# Patient Record
Sex: MALE | Race: WHITE | HISPANIC OR LATINO | Employment: FULL TIME | URBAN - METROPOLITAN AREA
[De-identification: names, ages, dates, MRNs, and addresses within clinical notes are randomized per-mention and may not be internally consistent; named-entity substitution may affect disease eponyms.]

---
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
## 2019-07-26 ENCOUNTER — OFFICE VISIT (OUTPATIENT)
Dept: URGENT CARE | Facility: PHYSICIAN GROUP | Age: 19
End: 2019-07-26
Payer: COMMERCIAL

## 2019-07-26 VITALS
OXYGEN SATURATION: 98 % | TEMPERATURE: 98 F | BODY MASS INDEX: 18.2 KG/M2 | SYSTOLIC BLOOD PRESSURE: 110 MMHG | HEIGHT: 71 IN | HEART RATE: 67 BPM | WEIGHT: 130 LBS | DIASTOLIC BLOOD PRESSURE: 58 MMHG

## 2019-07-26 DIAGNOSIS — Z20.2 EXPOSURE TO CHLAMYDIA: ICD-10-CM

## 2019-07-26 PROCEDURE — 99214 OFFICE O/P EST MOD 30 MIN: CPT | Performed by: FAMILY MEDICINE

## 2019-07-26 RX ORDER — AZITHROMYCIN 500 MG/1
1000 TABLET, FILM COATED ORAL ONCE
Qty: 2 TAB | Refills: 0 | Status: SHIPPED | OUTPATIENT
Start: 2019-07-26 | End: 2019-07-26

## 2019-07-26 NOTE — LETTER
July 26, 2019         Patient: Daniel Hastings   YOB: 2000   Date of Visit: 7/26/2019           To Whom it May Concern:    Daniel Hastings was seen in my clinic on 7/26/2019. He may return to work on 7/27..    If you have any questions or concerns, please don't hesitate to call.        Sincerely,           Viet Benito M.D.  Electronically Signed

## 2019-07-27 NOTE — PROGRESS NOTES
"   Chief Complaint   Patient presents with   • Medication Refill     girlfriend came up pos for chlamydia / requesting meds        HPI:      Partner recently told him that she was dx with chlamydia.   He has no sx.   Last unprotected intercourse was several days ago.             Past medical history was unremarkable and not pertinent to current issue  Social hx - denies tobacco, alcohol, drug use  Family hx was reviewed - no pertinent past family hx             Review of Systems   Constitutional: Negative for fever, chills and malaise/fatigue.   Eyes: Negative for vision changes, d/c.    Respiratory: Negative for cough and sputum production.    Cardiovascular: Negative for chest pain and palpitations.   Gastrointestinal: Negative for nausea, vomiting, abdominal pain, diarrhea and constipation.   Genitourinary: Negative for dysuria, urgency and frequency.   Skin: Negative for rash or  itching.   Neurological: Negative for dizziness and tingling.   Psychiatric/Behavioral: Negative for depression.   Hematologic/lymphatic - denies bruising or excessive bleeding  All other systems reviewed and are negative.      Objective  /58   Pulse 67   Temp 36.7 °C (98 °F) (Temporal)   Ht 1.803 m (5' 11\")   Wt 59 kg (130 lb)   SpO2 98%       HEENT - PERRLA, EOMI  Neuro - alert and oriented x3. CN 2-12 grossly intact.  Lungs - CTA. No wheezes, rhonchi or rales.  Heart - regular rate and rhythm without murmur.  Abdomen - soft and non-tender, bowel sounds active x4.  Musculoskeletal - No lower extremity edema noted.        A/P:       1. Exposure to chlamydia    will treat for chalymdia.     - azithromycin (ZITHROMAX) 500 MG tablet; Take 2 Tabs by mouth Once for 1 dose.  Dispense: 2 Tab; Refill: 0        "

## 2019-09-16 ENCOUNTER — OFFICE VISIT (OUTPATIENT)
Dept: URGENT CARE | Facility: PHYSICIAN GROUP | Age: 19
End: 2019-09-16
Payer: COMMERCIAL

## 2019-09-16 VITALS
DIASTOLIC BLOOD PRESSURE: 80 MMHG | HEIGHT: 71 IN | HEART RATE: 75 BPM | SYSTOLIC BLOOD PRESSURE: 112 MMHG | BODY MASS INDEX: 18.62 KG/M2 | RESPIRATION RATE: 18 BRPM | WEIGHT: 133 LBS | OXYGEN SATURATION: 99 % | TEMPERATURE: 97.4 F

## 2019-09-16 DIAGNOSIS — R31.21 ASYMPTOMATIC MICROSCOPIC HEMATURIA: ICD-10-CM

## 2019-09-16 LAB
APPEARANCE UR: CLEAR
BILIRUB UR STRIP-MCNC: NEGATIVE MG/DL
COLOR UR AUTO: YELLOW
GLUCOSE UR STRIP.AUTO-MCNC: NEGATIVE MG/DL
KETONES UR STRIP.AUTO-MCNC: NEGATIVE MG/DL
LEUKOCYTE ESTERASE UR QL STRIP.AUTO: NEGATIVE
NITRITE UR QL STRIP.AUTO: NEGATIVE
PH UR STRIP.AUTO: 5.5 [PH] (ref 5–8)
PROT UR QL STRIP: NEGATIVE MG/DL
RBC UR QL AUTO: NORMAL
SP GR UR STRIP.AUTO: 1.02
UROBILINOGEN UR STRIP-MCNC: 0.2 MG/DL

## 2019-09-16 PROCEDURE — 81002 URINALYSIS NONAUTO W/O SCOPE: CPT | Performed by: PHYSICIAN ASSISTANT

## 2019-09-16 PROCEDURE — 99214 OFFICE O/P EST MOD 30 MIN: CPT | Performed by: PHYSICIAN ASSISTANT

## 2019-09-16 ASSESSMENT — ENCOUNTER SYMPTOMS
VOMITING: 0
BRUISES/BLEEDS EASILY: 0
FEVER: 0
MYALGIAS: 0
FLANK PAIN: 0
HEADACHES: 0
CHILLS: 0
WEIGHT LOSS: 0
DIZZINESS: 0
ABDOMINAL PAIN: 0
BACK PAIN: 0
NAUSEA: 0
DIARRHEA: 0

## 2019-09-16 NOTE — PROGRESS NOTES
"Subjective:      Daniel Hastings is a 18 y.o. male who presents with Blood in Urine (x4days )            HPI  17 y/o male presents to  with new problem of intermittent painless hematuria at end of urine stream onset 4 days ago.   Denies passing clots.    No abdominal pain or flank pain. No penile discharge.    Denies f/c, n/v, or diarrhea.   Denies hx of same or cigarette smoking.   Denies other associated aggravating or alleviating factors.     Review of Systems   Constitutional: Negative for chills, fever, malaise/fatigue and weight loss.   Gastrointestinal: Negative for abdominal pain, diarrhea, nausea and vomiting.   Genitourinary: Positive for hematuria. Negative for dysuria, flank pain, frequency and urgency.   Musculoskeletal: Negative for back pain and myalgias.   Skin: Negative for itching and rash.   Neurological: Negative for dizziness and headaches.   Endo/Heme/Allergies: Does not bruise/bleed easily.       History reviewed. No pertinent past medical history.  Current Outpatient Medications on File Prior to Visit   Medication Sig Dispense Refill   • ibuprofen (MOTRIN) 200 MG Tab Take 200 mg by mouth every 6 hours as needed.     • acetaminophen (TYLENOL) 325 MG Tab Take 650 mg by mouth every four hours as needed.     • ondansetron (ZOFRAN) 4 MG Tab tablet Take 1 Tab by mouth every 6 hours as needed for Nausea/Vomiting. (Patient not taking: Reported on 7/31/2018) 15 Tab 0   • ondansetron (ZOFRAN) 4 MG Tab tablet Take 1 Tab by mouth every four hours as needed for Nausea/Vomiting. (Patient not taking: Reported on 9/16/2019) 30 Tab 1     No current facility-administered medications on file prior to visit.      No Known Allergies  Social History     Tobacco Use   • Smoking status: Never Smoker   • Smokeless tobacco: Never Used   Substance Use Topics   • Alcohol use: No      Objective:     /80   Pulse 75   Temp 36.3 °C (97.4 °F) (Temporal)   Resp 18   Ht 1.803 m (5' 11\")   Wt 60.3 kg (133 lb)   " SpO2 99%   BMI 18.55 kg/m²      Physical Exam   Constitutional: He is oriented to person, place, and time. He appears well-developed and well-nourished.   HENT:   Head: Normocephalic and atraumatic.   Mouth/Throat: Oropharynx is clear and moist.   Eyes: Conjunctivae and EOM are normal.   Neck: Normal range of motion. Neck supple.   Cardiovascular: Normal rate, regular rhythm and normal heart sounds.   Pulmonary/Chest: Effort normal and breath sounds normal. No respiratory distress.   Abdominal: Soft. Bowel sounds are normal. He exhibits no distension and no mass. There is no tenderness. There is no guarding and no CVA tenderness.   Genitourinary:   Genitourinary Comments: Patient defers  exam   Neurological: He is alert and oriented to person, place, and time.   Skin: Skin is warm and dry. No erythema.   Psychiatric: He has a normal mood and affect. His behavior is normal. Judgment and thought content normal.   Vitals reviewed.              Assessment/Plan:     1. Asymptomatic microscopic hematuria  POCT Urinalysis    REFERRAL TO UROLOGY    Basic Metabolic Panel     Results for orders placed or performed in visit on 09/16/19   POCT Urinalysis   Result Value Ref Range    POC Color yellow Negative    POC Appearance clear Negative    POC Leukocyte Esterase negative Negative    POC Nitrites negative Negative    POC Urobiligen 0.2 Negative (0.2) mg/dL    POC Protein negative Negative mg/dL    POC Urine PH 5.5 5.0 - 8.0    POC Blood trace-intact Negative    POC Specific Gravity 1.025 <1.005 - >1.030    POC Ketones negative Negative mg/dL    POC Bilirubin negative Negative mg/dL    POC Glucose negative Negative mg/dL     Recommend patient follow up with urology, referral given.  Will follow-up pending lab results. BMP ordered to evaluate kidney function.  PT should follow up with PCP in 1-2 days for re-evaluation if symptoms have not improved.  Discussed red flags and reasons to return to UC or ED.  Pt and/or family  verbalized understanding of diagnosis and follow up instructions and was offered informational handout on diagnosis.  PT discharged.

## 2019-09-17 ENCOUNTER — TELEPHONE (OUTPATIENT)
Dept: URGENT CARE | Facility: PHYSICIAN GROUP | Age: 19
End: 2019-09-17

## 2019-09-17 NOTE — TELEPHONE ENCOUNTER
New order placed for BNP.  Will follow-up pending results. Patient verbalized understand of treatment plan and has no further questions regarding care.

## 2019-09-18 ENCOUNTER — HOSPITAL ENCOUNTER (OUTPATIENT)
Dept: LAB | Facility: MEDICAL CENTER | Age: 19
End: 2019-09-18
Attending: PHYSICIAN ASSISTANT
Payer: COMMERCIAL

## 2019-09-18 DIAGNOSIS — R31.21 ASYMPTOMATIC MICROSCOPIC HEMATURIA: ICD-10-CM

## 2019-09-18 LAB
ANION GAP SERPL CALC-SCNC: 8 MMOL/L (ref 0–11.9)
BUN SERPL-MCNC: 10 MG/DL (ref 8–22)
CALCIUM SERPL-MCNC: 9.7 MG/DL (ref 8.5–10.5)
CHLORIDE SERPL-SCNC: 105 MMOL/L (ref 96–112)
CO2 SERPL-SCNC: 26 MMOL/L (ref 20–33)
CREAT SERPL-MCNC: 0.87 MG/DL (ref 0.5–1.4)
GLUCOSE SERPL-MCNC: 95 MG/DL (ref 65–99)
POTASSIUM SERPL-SCNC: 4 MMOL/L (ref 3.6–5.5)
SODIUM SERPL-SCNC: 139 MMOL/L (ref 135–145)

## 2019-09-18 PROCEDURE — 36415 COLL VENOUS BLD VENIPUNCTURE: CPT

## 2019-09-18 PROCEDURE — 80048 BASIC METABOLIC PNL TOTAL CA: CPT

## 2019-09-23 ENCOUNTER — HOSPITAL ENCOUNTER (OUTPATIENT)
Dept: LAB | Facility: MEDICAL CENTER | Age: 19
End: 2019-09-23
Attending: PHYSICIAN ASSISTANT
Payer: COMMERCIAL

## 2019-09-23 PROCEDURE — 87491 CHLMYD TRACH DNA AMP PROBE: CPT

## 2019-09-23 PROCEDURE — 87591 N.GONORRHOEAE DNA AMP PROB: CPT

## 2019-09-25 LAB
C TRACH DNA SPEC QL NAA+PROBE: NEGATIVE
N GONORRHOEA DNA SPEC QL NAA+PROBE: NEGATIVE
SPECIMEN SOURCE: NORMAL

## 2019-10-15 ENCOUNTER — APPOINTMENT (OUTPATIENT)
Dept: RADIOLOGY | Facility: MEDICAL CENTER | Age: 19
End: 2019-10-15
Attending: PHYSICIAN ASSISTANT
Payer: COMMERCIAL

## 2020-09-01 ENCOUNTER — OFFICE VISIT (OUTPATIENT)
Dept: URGENT CARE | Facility: PHYSICIAN GROUP | Age: 20
End: 2020-09-01
Payer: COMMERCIAL

## 2020-09-01 VITALS
SYSTOLIC BLOOD PRESSURE: 124 MMHG | HEIGHT: 71 IN | RESPIRATION RATE: 12 BRPM | BODY MASS INDEX: 19.6 KG/M2 | HEART RATE: 70 BPM | WEIGHT: 140 LBS | DIASTOLIC BLOOD PRESSURE: 90 MMHG | TEMPERATURE: 97.8 F | OXYGEN SATURATION: 97 %

## 2020-09-01 DIAGNOSIS — G43.009 MIGRAINE WITHOUT AURA AND WITHOUT STATUS MIGRAINOSUS, NOT INTRACTABLE: ICD-10-CM

## 2020-09-01 PROCEDURE — 99213 OFFICE O/P EST LOW 20 MIN: CPT | Performed by: FAMILY MEDICINE

## 2020-09-01 ASSESSMENT — ENCOUNTER SYMPTOMS
EYE REDNESS: 0
WEIGHT LOSS: 0
EYE DISCHARGE: 0
MYALGIAS: 0
VOMITING: 0
PHOTOPHOBIA: 0
NAUSEA: 0

## 2020-09-01 NOTE — LETTER
September 1, 2020         Patient: Daniel Hastings   YOB: 2000   Date of Visit: 9/1/2020           To Whom it May Concern:    Daniel Hastings was seen in my clinic on 9/1/2020. He was seen for a recurrent medical problem that is not a contagious illness.  Please excuse 8/29/2020. He may return to full duty at his next scheduled shift. Again, he is not contagious and has no evidence of COVID19 infection.       Sincerely,           Cirilo Mina M.D.  Electronically Signed

## 2020-09-01 NOTE — PROGRESS NOTES
"Subjective:      Daniel Hastings is a 19 y.o. male who presents with Migraine (migraine on saturday, no migraine today )            Patient with PMH migraine headaches had a headache on 8/29/2020.  This has resolved completely with going home and sleeping.  His workplace is requesting a note that this is not infectious.  He has had no fever, no nasal congestion, no sore throat, no myalgia, no arthralgia, no nausea vomiting or diarrhea.  No other aggravating or alleviating factors.      Review of Systems   Constitutional: Negative for malaise/fatigue and weight loss.   Eyes: Negative for photophobia, discharge and redness.   Gastrointestinal: Negative for nausea and vomiting.   Musculoskeletal: Negative for joint pain and myalgias.   Skin: Negative for itching and rash.     .  Medications, Allergies, and current problem list reviewed today in Epic       Objective:     /90   Pulse 70   Temp 36.6 °C (97.8 °F)   Resp 12   Ht 1.803 m (5' 11\")   Wt 63.5 kg (140 lb)   SpO2 97%   BMI 19.53 kg/m²      Physical Exam  Constitutional:       General: He is not in acute distress.     Appearance: He is well-developed.   HENT:      Head: Normocephalic and atraumatic.   Eyes:      Conjunctiva/sclera: Conjunctivae normal.   Cardiovascular:      Rate and Rhythm: Normal rate and regular rhythm.      Heart sounds: Normal heart sounds. No murmur.   Pulmonary:      Effort: Pulmonary effort is normal.      Breath sounds: Normal breath sounds. No wheezing.   Skin:     General: Skin is warm and dry.      Findings: No rash.   Neurological:      Mental Status: He is alert and oriented to person, place, and time.                 Assessment/Plan:         1. Migraine without aura and without status migrainosus, not intractable       Differential diagnosis, natural history, supportive care, and indications for immediate follow-up discussed at length.     Resolved  Cleared for work, no evidence of infectious/contagious process.   "

## 2020-09-01 NOTE — LETTER
September 1, 2020         Patient: Daniel Hastings   YOB: 2000   Date of Visit: 9/1/2020           To Whom it May Concern:    Daniel Hastings was seen in my clinic on 9/1/2020.  Please excuse 8/29/2020.  He may return to full duty at his next scheduled shift and is not considered contagious.      Sincerely,           Cirilo Mina M.D.  Electronically Signed

## 2022-01-06 ENCOUNTER — OFFICE VISIT (OUTPATIENT)
Dept: URGENT CARE | Facility: CLINIC | Age: 22
End: 2022-01-06
Payer: COMMERCIAL

## 2022-01-06 VITALS
HEIGHT: 71 IN | SYSTOLIC BLOOD PRESSURE: 110 MMHG | WEIGHT: 144.62 LBS | HEART RATE: 70 BPM | OXYGEN SATURATION: 98 % | TEMPERATURE: 98 F | RESPIRATION RATE: 16 BRPM | DIASTOLIC BLOOD PRESSURE: 70 MMHG | BODY MASS INDEX: 20.25 KG/M2

## 2022-01-06 DIAGNOSIS — L74.0 HEAT RASH: ICD-10-CM

## 2022-01-06 PROCEDURE — 99213 OFFICE O/P EST LOW 20 MIN: CPT | Performed by: NURSE PRACTITIONER

## 2022-01-06 ASSESSMENT — ENCOUNTER SYMPTOMS
MYALGIAS: 0
DIZZINESS: 0
WHEEZING: 0
PALPITATIONS: 0
CONSTIPATION: 0
HEADACHES: 0
ABDOMINAL PAIN: 0
ORTHOPNEA: 0
TINGLING: 0
DIARRHEA: 0
SHORTNESS OF BREATH: 0
NAUSEA: 0
WEAKNESS: 0
FEVER: 0
CHILLS: 0
VOMITING: 0
SENSORY CHANGE: 0

## 2022-01-06 NOTE — LETTER
January 6, 2022       Patient: Daniel Hastings   YOB: 2000   Date of Visit: 1/6/2022         To Whom It May Concern:    In my medical opinion, I recommend that Daniel Hastings be excused from work today due to non-contagious illness.    If you have any questions or concerns, please don't hesitate to call [phone]          Sincerely,          CAITLIN Dempsey.  Electronically Signed

## 2022-01-06 NOTE — PROGRESS NOTES
"Subjective     Daniel Hastings is a 21 y.o. male who presents with Itching (from the waist down)            HPI  States had episode of itchiness and heat to groin area when at work today.  works in a sweaty environment. No over the counter meds used. States had been scratching groin but states no rash or redness now. Requesting work note.     PMH:  has no past medical history on file.  MEDS: No current outpatient medications on file.  ALLERGIES: No Known Allergies  SURGHX: History reviewed. No pertinent surgical history.  SOCHX:  reports that he has never smoked. He has never used smokeless tobacco. He reports previous drug use. Drug: Marijuana. He reports that he does not drink alcohol.  FH: Family history was reviewed, no pertinent findings to report    Review of Systems   Constitutional: Negative for chills, fever and malaise/fatigue.   Respiratory: Negative for shortness of breath and wheezing.    Cardiovascular: Negative for chest pain, palpitations and orthopnea.   Gastrointestinal: Negative for abdominal pain, constipation, diarrhea, nausea and vomiting.   Musculoskeletal: Negative for myalgias.   Skin: Positive for itching. Negative for rash.   Neurological: Negative for dizziness, tingling, sensory change, weakness and headaches.   Endo/Heme/Allergies: Negative for environmental allergies.   All other systems reviewed and are negative.             Objective     /70   Pulse 70   Temp 36.7 °C (98 °F) (Temporal)   Resp 16   Ht 1.803 m (5' 11\")   Wt 65.6 kg (144 lb 10 oz)   SpO2 98%   BMI 20.17 kg/m²      Physical Exam  Vitals reviewed.   Constitutional:       General: He is awake. He is not in acute distress.     Appearance: Normal appearance. He is well-developed. He is not ill-appearing, toxic-appearing or diaphoretic.   HENT:      Head: Normocephalic.   Eyes:      Conjunctiva/sclera: Conjunctivae normal.      Pupils: Pupils are equal, round, and reactive to light.   Cardiovascular:      " Rate and Rhythm: Normal rate.   Pulmonary:      Effort: Pulmonary effort is normal.   Genitourinary:     Comments: Patient in gown with underwear on. No rash, erythema or indication for skin abnormality at groin region.   Musculoskeletal:         General: Normal range of motion.      Cervical back: Normal range of motion and neck supple.   Skin:     General: Skin is warm and dry.      Findings: No abrasion, abscess, acne, bruising, burn, ecchymosis, erythema, signs of injury, laceration, lesion, petechiae, rash or wound.   Neurological:      Mental Status: He is alert and oriented to person, place, and time.   Psychiatric:         Attention and Perception: Attention normal.         Mood and Affect: Mood normal.         Speech: Speech normal.         Behavior: Behavior normal. Behavior is cooperative.                             Assessment & Plan             1. Heat rash    -Most probable heat rash that has resolved  -May use over the counter Zyrtec which he has at home as needed for itchiness  -May use over the counter hydrocortisone cream 1% TWICE DAILY as needed for any rash  Work note provided

## 2025-02-14 ENCOUNTER — APPOINTMENT (OUTPATIENT)
Dept: RADIOLOGY | Facility: MEDICAL CENTER | Age: 25
DRG: 062 | End: 2025-02-14
Attending: EMERGENCY MEDICINE
Payer: COMMERCIAL

## 2025-02-14 ENCOUNTER — HOSPITAL ENCOUNTER (INPATIENT)
Facility: MEDICAL CENTER | Age: 25
LOS: 4 days | DRG: 062 | End: 2025-02-18
Attending: EMERGENCY MEDICINE | Admitting: INTERNAL MEDICINE
Payer: COMMERCIAL

## 2025-02-14 ENCOUNTER — APPOINTMENT (OUTPATIENT)
Dept: CARDIOLOGY | Facility: MEDICAL CENTER | Age: 25
DRG: 062 | End: 2025-02-14
Attending: INTERNAL MEDICINE
Payer: COMMERCIAL

## 2025-02-14 ENCOUNTER — APPOINTMENT (OUTPATIENT)
Dept: RADIOLOGY | Facility: MEDICAL CENTER | Age: 25
DRG: 062 | End: 2025-02-14
Attending: PSYCHIATRY & NEUROLOGY
Payer: COMMERCIAL

## 2025-02-14 DIAGNOSIS — R53.1 LEFT-SIDED WEAKNESS: ICD-10-CM

## 2025-02-14 DIAGNOSIS — I63.9 ACUTE CVA (CEREBROVASCULAR ACCIDENT) (HCC): ICD-10-CM

## 2025-02-14 DIAGNOSIS — Q21.12 PFO (PATENT FORAMEN OVALE): ICD-10-CM

## 2025-02-14 DIAGNOSIS — I63.9 CEREBROVASCULAR ACCIDENT (CVA), UNSPECIFIED MECHANISM (HCC): ICD-10-CM

## 2025-02-14 DIAGNOSIS — I63.9 ACUTE ISCHEMIC STROKE (HCC): ICD-10-CM

## 2025-02-14 PROBLEM — E87.6 HYPOKALEMIA: Status: ACTIVE | Noted: 2025-02-14

## 2025-02-14 PROBLEM — D72.820 LYMPHOCYTOSIS: Status: ACTIVE | Noted: 2025-02-14

## 2025-02-14 PROBLEM — G43.909 MIGRAINES: Status: ACTIVE | Noted: 2025-02-14

## 2025-02-14 LAB
ABO + RH BLD: NORMAL
ABO GROUP BLD: NORMAL
ALBUMIN SERPL BCP-MCNC: 4.3 G/DL (ref 3.2–4.9)
ALBUMIN/GLOB SERPL: 1.4 G/DL
ALP SERPL-CCNC: 72 U/L (ref 30–99)
ALT SERPL-CCNC: 16 U/L (ref 2–50)
AMPHET UR QL SCN: NEGATIVE
ANION GAP SERPL CALC-SCNC: 12 MMOL/L (ref 7–16)
APTT PPP: 24 SEC (ref 24.7–36)
AST SERPL-CCNC: 26 U/L (ref 12–45)
BARBITURATES UR QL SCN: NEGATIVE
BASOPHILS # BLD AUTO: 0.9 % (ref 0–1.8)
BASOPHILS # BLD: 0.08 K/UL (ref 0–0.12)
BENZODIAZ UR QL SCN: NEGATIVE
BILIRUB SERPL-MCNC: 0.5 MG/DL (ref 0.1–1.5)
BLD GP AB SCN SERPL QL: NORMAL
BUN SERPL-MCNC: 11 MG/DL (ref 8–22)
BZE UR QL SCN: NEGATIVE
CALCIUM ALBUM COR SERPL-MCNC: 9.3 MG/DL (ref 8.5–10.5)
CALCIUM SERPL-MCNC: 9.5 MG/DL (ref 8.5–10.5)
CANNABINOIDS UR QL SCN: POSITIVE
CHLORIDE SERPL-SCNC: 103 MMOL/L (ref 96–112)
CHOLEST SERPL-MCNC: 125 MG/DL (ref 100–199)
CO2 SERPL-SCNC: 25 MMOL/L (ref 20–33)
COMMENT NL1176: NORMAL
CREAT SERPL-MCNC: 1.04 MG/DL (ref 0.5–1.4)
EKG IMPRESSION: NORMAL
EOSINOPHIL # BLD AUTO: 0.24 K/UL (ref 0–0.51)
EOSINOPHIL NFR BLD: 2.6 % (ref 0–6.9)
ERYTHROCYTE [DISTWIDTH] IN BLOOD BY AUTOMATED COUNT: 40.7 FL (ref 35.9–50)
EST. AVERAGE GLUCOSE BLD GHB EST-MCNC: 128 MG/DL
FENTANYL UR QL: NEGATIVE
GFR SERPLBLD CREATININE-BSD FMLA CKD-EPI: 102 ML/MIN/1.73 M 2
GLOBULIN SER CALC-MCNC: 3 G/DL (ref 1.9–3.5)
GLUCOSE SERPL-MCNC: 140 MG/DL (ref 65–99)
HBA1C MFR BLD: 6.1 % (ref 4–5.6)
HCT VFR BLD AUTO: 47.7 % (ref 42–52)
HDLC SERPL-MCNC: 46 MG/DL
HGB BLD-MCNC: 16.3 G/DL (ref 14–18)
INR PPP: 1.11 (ref 0.87–1.13)
LDLC SERPL CALC-MCNC: 63 MG/DL
LV EJECT FRACT MOD 2C 99903: 58.08
LV EJECT FRACT MOD 4C 99902: 55.58
LV EJECT FRACT MOD BP 99901: 57.38
LYMPHOCYTES # BLD AUTO: 5.22 K/UL (ref 1–4.8)
LYMPHOCYTES NFR BLD: 57.4 % (ref 22–41)
MANUAL DIFF BLD: NORMAL
MCH RBC QN AUTO: 31.2 PG (ref 27–33)
MCHC RBC AUTO-ENTMCNC: 34.2 G/DL (ref 32.3–36.5)
MCV RBC AUTO: 91.4 FL (ref 81.4–97.8)
METHADONE UR QL SCN: NEGATIVE
MONOCYTES # BLD AUTO: 0.71 K/UL (ref 0–0.85)
MONOCYTES NFR BLD AUTO: 7.8 % (ref 0–13.4)
MORPHOLOGY BLD-IMP: NORMAL
NEUTROPHILS # BLD AUTO: 2.85 K/UL (ref 1.82–7.42)
NEUTROPHILS NFR BLD: 31.3 % (ref 44–72)
NRBC # BLD AUTO: 0 K/UL
NRBC BLD-RTO: 0 /100 WBC (ref 0–0.2)
OPIATES UR QL SCN: NEGATIVE
OXYCODONE UR QL SCN: NEGATIVE
PCP UR QL SCN: NEGATIVE
PLATELET # BLD AUTO: 246 K/UL (ref 164–446)
PLATELET BLD QL SMEAR: NORMAL
PMV BLD AUTO: 9.8 FL (ref 9–12.9)
POTASSIUM SERPL-SCNC: 3.6 MMOL/L (ref 3.6–5.5)
PROPOXYPH UR QL SCN: NEGATIVE
PROT SERPL-MCNC: 7.3 G/DL (ref 6–8.2)
PROTHROMBIN TIME: 14.4 SEC (ref 12–14.6)
RBC # BLD AUTO: 5.22 M/UL (ref 4.7–6.1)
RBC BLD AUTO: PRESENT
RH BLD: NORMAL
SMUDGE CELLS BLD QL SMEAR: NORMAL
SODIUM SERPL-SCNC: 140 MMOL/L (ref 135–145)
TRIGL SERPL-MCNC: 81 MG/DL (ref 0–149)
TROPONIN T SERPL-MCNC: <6 NG/L (ref 6–19)
WBC # BLD AUTO: 9.1 K/UL (ref 4.8–10.8)

## 2025-02-14 PROCEDURE — 36415 COLL VENOUS BLD VENIPUNCTURE: CPT

## 2025-02-14 PROCEDURE — 70498 CT ANGIOGRAPHY NECK: CPT

## 2025-02-14 PROCEDURE — 83036 HEMOGLOBIN GLYCOSYLATED A1C: CPT

## 2025-02-14 PROCEDURE — 93005 ELECTROCARDIOGRAM TRACING: CPT | Mod: TC | Performed by: NURSE PRACTITIONER

## 2025-02-14 PROCEDURE — 85300 ANTITHROMBIN III ACTIVITY: CPT

## 2025-02-14 PROCEDURE — 160036 HCHG PACU - EA ADDL 30 MINS PHASE I

## 2025-02-14 PROCEDURE — 85613 RUSSELL VIPER VENOM DILUTED: CPT | Mod: 91

## 2025-02-14 PROCEDURE — 80061 LIPID PANEL: CPT

## 2025-02-14 PROCEDURE — 81241 F5 GENE: CPT

## 2025-02-14 PROCEDURE — 700111 HCHG RX REV CODE 636 W/ 250 OVERRIDE (IP): Performed by: INTERNAL MEDICINE

## 2025-02-14 PROCEDURE — 99291 CRITICAL CARE FIRST HOUR: CPT

## 2025-02-14 PROCEDURE — 86901 BLOOD TYPING SEROLOGIC RH(D): CPT

## 2025-02-14 PROCEDURE — C1894 INTRO/SHEATH, NON-LASER: HCPCS

## 2025-02-14 PROCEDURE — 92610 EVALUATE SWALLOWING FUNCTION: CPT

## 2025-02-14 PROCEDURE — 93005 ELECTROCARDIOGRAM TRACING: CPT | Mod: TC | Performed by: EMERGENCY MEDICINE

## 2025-02-14 PROCEDURE — 700117 HCHG RX CONTRAST REV CODE 255: Performed by: EMERGENCY MEDICINE

## 2025-02-14 PROCEDURE — 99222 1ST HOSP IP/OBS MODERATE 55: CPT | Performed by: PSYCHIATRY & NEUROLOGY

## 2025-02-14 PROCEDURE — 71045 X-RAY EXAM CHEST 1 VIEW: CPT

## 2025-02-14 PROCEDURE — 85303 CLOT INHIBIT PROT C ACTIVITY: CPT

## 2025-02-14 PROCEDURE — 80307 DRUG TEST PRSMV CHEM ANLYZR: CPT

## 2025-02-14 PROCEDURE — 700102 HCHG RX REV CODE 250 W/ 637 OVERRIDE(OP): Performed by: INTERNAL MEDICINE

## 2025-02-14 PROCEDURE — 3E03317 INTRODUCTION OF OTHER THROMBOLYTIC INTO PERIPHERAL VEIN, PERCUTANEOUS APPROACH: ICD-10-PCS | Performed by: RADIOLOGY

## 2025-02-14 PROCEDURE — 81240 F2 GENE: CPT

## 2025-02-14 PROCEDURE — 99292 CRITICAL CARE ADDL 30 MIN: CPT | Performed by: INTERNAL MEDICINE

## 2025-02-14 PROCEDURE — 160002 HCHG RECOVERY MINUTES (STAT)

## 2025-02-14 PROCEDURE — 700105 HCHG RX REV CODE 258: Performed by: INTERNAL MEDICINE

## 2025-02-14 PROCEDURE — 86147 CARDIOLIPIN ANTIBODY EA IG: CPT | Mod: 91

## 2025-02-14 PROCEDURE — 160035 HCHG PACU - 1ST 60 MINS PHASE I

## 2025-02-14 PROCEDURE — 84484 ASSAY OF TROPONIN QUANT: CPT

## 2025-02-14 PROCEDURE — 85730 THROMBOPLASTIN TIME PARTIAL: CPT

## 2025-02-14 PROCEDURE — A9270 NON-COVERED ITEM OR SERVICE: HCPCS | Performed by: INTERNAL MEDICINE

## 2025-02-14 PROCEDURE — 99291 CRITICAL CARE FIRST HOUR: CPT | Performed by: INTERNAL MEDICINE

## 2025-02-14 PROCEDURE — 93306 TTE W/DOPPLER COMPLETE: CPT | Mod: 26 | Performed by: INTERNAL MEDICINE

## 2025-02-14 PROCEDURE — 86850 RBC ANTIBODY SCREEN: CPT

## 2025-02-14 PROCEDURE — 85306 CLOT INHIBIT PROT S FREE: CPT

## 2025-02-14 PROCEDURE — 85610 PROTHROMBIN TIME: CPT

## 2025-02-14 PROCEDURE — 80053 COMPREHEN METABOLIC PANEL: CPT

## 2025-02-14 PROCEDURE — 93306 TTE W/DOPPLER COMPLETE: CPT

## 2025-02-14 PROCEDURE — 86900 BLOOD TYPING SEROLOGIC ABO: CPT

## 2025-02-14 PROCEDURE — 0042T CT-CEREBRAL PERFUSION ANALYSIS: CPT

## 2025-02-14 PROCEDURE — 85007 BL SMEAR W/DIFF WBC COUNT: CPT

## 2025-02-14 PROCEDURE — 70496 CT ANGIOGRAPHY HEAD: CPT

## 2025-02-14 PROCEDURE — 700111 HCHG RX REV CODE 636 W/ 250 OVERRIDE (IP): Mod: JW | Performed by: PSYCHIATRY & NEUROLOGY

## 2025-02-14 PROCEDURE — 94760 N-INVAS EAR/PLS OXIMETRY 1: CPT

## 2025-02-14 PROCEDURE — 70450 CT HEAD/BRAIN W/O DYE: CPT

## 2025-02-14 PROCEDURE — 85027 COMPLETE CBC AUTOMATED: CPT

## 2025-02-14 PROCEDURE — 86146 BETA-2 GLYCOPROTEIN ANTIBODY: CPT

## 2025-02-14 PROCEDURE — 770022 HCHG ROOM/CARE - ICU (200)

## 2025-02-14 PROCEDURE — 96374 THER/PROPH/DIAG INJ IV PUSH: CPT

## 2025-02-14 PROCEDURE — B316ZZZ FLUOROSCOPY OF RIGHT INTERNAL CAROTID ARTERY: ICD-10-PCS | Performed by: RADIOLOGY

## 2025-02-14 RX ORDER — POTASSIUM CHLORIDE 7.45 MG/ML
10 INJECTION INTRAVENOUS
Status: DISPENSED | OUTPATIENT
Start: 2025-02-14 | End: 2025-02-14

## 2025-02-14 RX ORDER — SODIUM CHLORIDE 9 MG/ML
INJECTION, SOLUTION INTRAVENOUS CONTINUOUS
Status: DISCONTINUED | OUTPATIENT
Start: 2025-02-14 | End: 2025-02-15

## 2025-02-14 RX ORDER — LORAZEPAM 2 MG/ML
0.5 INJECTION INTRAMUSCULAR EVERY 4 HOURS PRN
Status: DISCONTINUED | OUTPATIENT
Start: 2025-02-14 | End: 2025-02-18 | Stop reason: HOSPADM

## 2025-02-14 RX ORDER — ONDANSETRON 4 MG/1
4 TABLET, ORALLY DISINTEGRATING ORAL EVERY 4 HOURS PRN
Status: DISCONTINUED | OUTPATIENT
Start: 2025-02-14 | End: 2025-02-18 | Stop reason: HOSPADM

## 2025-02-14 RX ORDER — LABETALOL HYDROCHLORIDE 5 MG/ML
10-20 INJECTION, SOLUTION INTRAVENOUS EVERY 4 HOURS PRN
Status: DISCONTINUED | OUTPATIENT
Start: 2025-02-14 | End: 2025-02-18 | Stop reason: HOSPADM

## 2025-02-14 RX ORDER — HYDRALAZINE HYDROCHLORIDE 20 MG/ML
10 INJECTION INTRAMUSCULAR; INTRAVENOUS
Status: DISCONTINUED | OUTPATIENT
Start: 2025-02-14 | End: 2025-02-14

## 2025-02-14 RX ORDER — HYDRALAZINE HYDROCHLORIDE 20 MG/ML
10-20 INJECTION INTRAMUSCULAR; INTRAVENOUS EVERY 4 HOURS PRN
Status: DISCONTINUED | OUTPATIENT
Start: 2025-02-14 | End: 2025-02-18 | Stop reason: HOSPADM

## 2025-02-14 RX ORDER — ACETAMINOPHEN 325 MG/1
650 TABLET ORAL EVERY 6 HOURS PRN
Status: DISCONTINUED | OUTPATIENT
Start: 2025-02-14 | End: 2025-02-18 | Stop reason: HOSPADM

## 2025-02-14 RX ORDER — ACETAMINOPHEN 325 MG/1
650 TABLET ORAL EVERY 4 HOURS PRN
Status: ON HOLD | COMMUNITY
End: 2025-02-18

## 2025-02-14 RX ORDER — ONDANSETRON 2 MG/ML
4 INJECTION INTRAMUSCULAR; INTRAVENOUS EVERY 4 HOURS PRN
Status: DISCONTINUED | OUTPATIENT
Start: 2025-02-14 | End: 2025-02-18 | Stop reason: HOSPADM

## 2025-02-14 RX ORDER — ATORVASTATIN CALCIUM 80 MG/1
80 TABLET, FILM COATED ORAL EVERY EVENING
Status: DISCONTINUED | OUTPATIENT
Start: 2025-02-14 | End: 2025-02-16

## 2025-02-14 RX ORDER — LABETALOL HYDROCHLORIDE 5 MG/ML
10 INJECTION, SOLUTION INTRAVENOUS
Status: DISCONTINUED | OUTPATIENT
Start: 2025-02-14 | End: 2025-02-14

## 2025-02-14 RX ORDER — HYDROMORPHONE HYDROCHLORIDE 1 MG/ML
.5-1 INJECTION, SOLUTION INTRAMUSCULAR; INTRAVENOUS; SUBCUTANEOUS
Status: DISCONTINUED | OUTPATIENT
Start: 2025-02-14 | End: 2025-02-16

## 2025-02-14 RX ADMIN — LORAZEPAM 0.5 MG: 2 INJECTION, SOLUTION INTRAMUSCULAR; INTRAVENOUS at 08:14

## 2025-02-14 RX ADMIN — SODIUM CHLORIDE: 9 INJECTION, SOLUTION INTRAVENOUS at 09:43

## 2025-02-14 RX ADMIN — IOHEXOL 80 ML: 350 INJECTION, SOLUTION INTRAVENOUS at 05:22

## 2025-02-14 RX ADMIN — ATORVASTATIN CALCIUM 80 MG: 80 TABLET, FILM COATED ORAL at 17:19

## 2025-02-14 RX ADMIN — HYDROMORPHONE HYDROCHLORIDE 1 MG: 1 INJECTION, SOLUTION INTRAMUSCULAR; INTRAVENOUS; SUBCUTANEOUS at 19:39

## 2025-02-14 RX ADMIN — POTASSIUM CHLORIDE 10 MEQ: 7.46 INJECTION, SOLUTION INTRAVENOUS at 11:29

## 2025-02-14 RX ADMIN — HYDROMORPHONE HYDROCHLORIDE 1 MG: 1 INJECTION, SOLUTION INTRAMUSCULAR; INTRAVENOUS; SUBCUTANEOUS at 07:53

## 2025-02-14 RX ADMIN — ONDANSETRON 4 MG: 2 INJECTION INTRAMUSCULAR; INTRAVENOUS at 07:32

## 2025-02-14 RX ADMIN — TENECTEPLASE 16 MG: KIT at 05:39

## 2025-02-14 RX ADMIN — IOHEXOL 40 ML: 350 INJECTION, SOLUTION INTRAVENOUS at 05:20

## 2025-02-14 RX ADMIN — ACETAMINOPHEN 650 MG: 325 TABLET ORAL at 14:06

## 2025-02-14 RX ADMIN — POTASSIUM CHLORIDE 10 MEQ: 7.46 INJECTION, SOLUTION INTRAVENOUS at 09:44

## 2025-02-14 RX ADMIN — POTASSIUM CHLORIDE 10 MEQ: 7.46 INJECTION, SOLUTION INTRAVENOUS at 12:26

## 2025-02-14 SDOH — ECONOMIC STABILITY: TRANSPORTATION INSECURITY
IN THE PAST 12 MONTHS, HAS LACK OF RELIABLE TRANSPORTATION KEPT YOU FROM MEDICAL APPOINTMENTS, MEETINGS, WORK OR FROM GETTING THINGS NEEDED FOR DAILY LIVING?: NO

## 2025-02-14 SDOH — ECONOMIC STABILITY: TRANSPORTATION INSECURITY
IN THE PAST 12 MONTHS, HAS THE LACK OF TRANSPORTATION KEPT YOU FROM MEDICAL APPOINTMENTS OR FROM GETTING MEDICATIONS?: NO

## 2025-02-14 ASSESSMENT — ENCOUNTER SYMPTOMS
SORE THROAT: 0
ABDOMINAL PAIN: 0
FEVER: 0
NECK PAIN: 0
SINUS PAIN: 0
WEAKNESS: 1
BLOOD IN STOOL: 0
DIAPHORESIS: 1
VOMITING: 0
WEAKNESS: 0
HEADACHES: 0
SENSORY CHANGE: 0
SPEECH CHANGE: 0
ORTHOPNEA: 0
LOSS OF CONSCIOUSNESS: 0
WHEEZING: 0
HEADACHES: 1
DIARRHEA: 0
NAUSEA: 0
WEIGHT LOSS: 0
SHORTNESS OF BREATH: 0
PALPITATIONS: 0
FOCAL WEAKNESS: 1
TINGLING: 1
SENSORY CHANGE: 1
CHILLS: 0
COUGH: 1
BLURRED VISION: 1
CONSTIPATION: 0
FALLS: 1

## 2025-02-14 ASSESSMENT — PAIN DESCRIPTION - PAIN TYPE
TYPE: SURGICAL PAIN
TYPE: SURGICAL PAIN
TYPE: ACUTE PAIN

## 2025-02-14 ASSESSMENT — COGNITIVE AND FUNCTIONAL STATUS - GENERAL
MOBILITY SCORE: 23
CLIMB 3 TO 5 STEPS WITH RAILING: A LITTLE
SUGGESTED CMS G CODE MODIFIER DAILY ACTIVITY: CH
DAILY ACTIVITIY SCORE: 24
SUGGESTED CMS G CODE MODIFIER MOBILITY: CI

## 2025-02-14 ASSESSMENT — COPD QUESTIONNAIRES
HAVE YOU SMOKED AT LEAST 100 CIGARETTES IN YOUR ENTIRE LIFE: NO/DON'T KNOW
COPD SCREENING SCORE: 0
DO YOU EVER COUGH UP ANY MUCUS OR PHLEGM?: NO/ONLY WITH OCCASIONAL COLDS OR INFECTIONS
DURING THE PAST 4 WEEKS HOW MUCH DID YOU FEEL SHORT OF BREATH: NONE/LITTLE OF THE TIME

## 2025-02-14 ASSESSMENT — SOCIAL DETERMINANTS OF HEALTH (SDOH)
WITHIN THE LAST YEAR, HAVE TO BEEN RAPED OR FORCED TO HAVE ANY KIND OF SEXUAL ACTIVITY BY YOUR PARTNER OR EX-PARTNER?: NO
WITHIN THE LAST YEAR, HAVE YOU BEEN AFRAID OF YOUR PARTNER OR EX-PARTNER?: NO
IN THE PAST 12 MONTHS, HAS THE ELECTRIC, GAS, OIL, OR WATER COMPANY THREATENED TO SHUT OFF SERVICE IN YOUR HOME?: NO
WITHIN THE PAST 12 MONTHS, THE FOOD YOU BOUGHT JUST DIDN'T LAST AND YOU DIDN'T HAVE MONEY TO GET MORE: NEVER TRUE
WITHIN THE PAST 12 MONTHS, YOU WORRIED THAT YOUR FOOD WOULD RUN OUT BEFORE YOU GOT THE MONEY TO BUY MORE: NEVER TRUE
WITHIN THE LAST YEAR, HAVE YOU BEEN KICKED, HIT, SLAPPED, OR OTHERWISE PHYSICALLY HURT BY YOUR PARTNER OR EX-PARTNER?: NO
WITHIN THE LAST YEAR, HAVE YOU BEEN HUMILIATED OR EMOTIONALLY ABUSED IN OTHER WAYS BY YOUR PARTNER OR EX-PARTNER?: NO

## 2025-02-14 ASSESSMENT — LIFESTYLE VARIABLES
HOW MANY TIMES IN THE PAST YEAR HAVE YOU HAD 5 OR MORE DRINKS IN A DAY: 0
EVER FELT BAD OR GUILTY ABOUT YOUR DRINKING: NO
TOTAL SCORE: 0
ON A TYPICAL DAY WHEN YOU DRINK ALCOHOL HOW MANY DRINKS DO YOU HAVE: 0
ALCOHOL_USE: NO
TOTAL SCORE: 0
AVERAGE NUMBER OF DAYS PER WEEK YOU HAVE A DRINK CONTAINING ALCOHOL: 0
EVER HAD A DRINK FIRST THING IN THE MORNING TO STEADY YOUR NERVES TO GET RID OF A HANGOVER: NO
TOTAL SCORE: 0
HAVE PEOPLE ANNOYED YOU BY CRITICIZING YOUR DRINKING: NO
CONSUMPTION TOTAL: NEGATIVE
HAVE YOU EVER FELT YOU SHOULD CUT DOWN ON YOUR DRINKING: NO

## 2025-02-14 ASSESSMENT — FIBROSIS 4 INDEX: FIB4 SCORE: 0.66

## 2025-02-14 ASSESSMENT — PATIENT HEALTH QUESTIONNAIRE - PHQ9
SUM OF ALL RESPONSES TO PHQ9 QUESTIONS 1 AND 2: 0
2. FEELING DOWN, DEPRESSED, IRRITABLE, OR HOPELESS: NOT AT ALL
1. LITTLE INTEREST OR PLEASURE IN DOING THINGS: NOT AT ALL

## 2025-02-14 NOTE — ED TRIAGE NOTES
"Stroke Alert    Chief Complaint   Patient presents with    Possible Stroke    Facial Droop    Weakness     Patient BIB EMS from home, pt's SO at home stating that patient was acting unlike himself. Per pt he woke up from sleep and was getting up to get water. Pt reports sudden weakness and collapsed to his knees. SO report pt was confused, report he couldn't feel his L hand/arm. Pt presented with left facial droop, some slurred speech, L motor drift and ataxia with EMS. LKW 0000. On arrival pt has some confusion, does not know the month, and left sided facial drift. Denies headache. Reports hx of migraines with aura. Pt does report smoking marijuana yesterday at 2300. Patient denies medical hx. NIH score 3. GCS 15.     EMS administered 4mg of Zofran IV PTA    /76   Pulse 62   Ht 1.803 m (5' 10.98\")   Wt 63.5 kg (140 lb)   SpO2 95%   BMI 19.53 kg/m²    "

## 2025-02-14 NOTE — ASSESSMENT & PLAN NOTE
Patient describes history of migraines with bitemporal distribution.  He reports some mostly in the mornings upon standing up quickly and notes that they resolved with Tylenol.  He takes 3-4 Tylenol per week.  -As needed acetaminophen while here

## 2025-02-14 NOTE — H&P
PULMONARY AND CRITICAL CARE MEDICINE HISTORY AND PHYSICAL EXAMINATION    Date of Admission:  2/14/2025    Admitting Physician:  Marcos Reyez MD    Reason for Admission:  Acute ischemic stroke    Chief Complaint:  Acute ischemic stroke    History of Present Illness:    I was kindly asked to see and evaluate Daniel Hastings, a 24 y.o. male for evaluation and management of the above problem.    This very nice gentleman has a history of migraines.  He presented to the ED this morning with left sided weakness and mild confusion.  Emergent CT imaging revealed a distal right M2 occlusion and perfusion defect.  He received tenecteplase and went to IR for emergent thrombectomy.  Angiography revealed lysis of his thrombus with TICI-2b flow and thrombectomy was not performed.    Medications Prior to Admission:    No current facility-administered medications on file prior to encounter.     Current Outpatient Medications on File Prior to Encounter   Medication Sig Dispense Refill    acetaminophen (TYLENOL) 325 MG Tab Take 650 mg by mouth every four hours as needed.         Current Medications:      Current Facility-Administered Medications:     [MAR Hold] Respiratory Therapy Consult, , Nebulization, Continuous RT, Marcos Reyez M.D.    [MAR Hold] acetaminophen (Tylenol) tablet 650 mg, 650 mg, Oral, Q6HRS PRN, Marcos Reyez M.D.    ondansetron (Zofran) syringe/vial injection 4 mg, 4 mg, Intravenous, Q4HRS PRN, Marcos Reyez M.D., 4 mg at 02/14/25 0732    [MAR Hold] ondansetron (Zofran ODT) dispertab 4 mg, 4 mg, Oral, Q4HRS PRN, Marcos Reyez M.D.    NS infusion, , Intravenous, Continuous, Marcos Reyez M.D.    [MAR Hold] atorvastatin (Lipitor) tablet 80 mg, 80 mg, Oral, Q EVENING, Marcos Reyez M.D.    [MAR Hold] MD Alert...ICU Electrolyte Replacement per Pharmacy, , Other, PHARMACY TO DOSE, Marcos Reyez M.D.    [MAR Hold] potassium chloride (KCL) ivpb 10  mEq, 10 mEq, Intravenous, Q HOUR, Marcos Reyez M.D.    labetalol (Normodyne/Trandate) injection 10 mg, 10 mg, Intravenous, Q10 MIN PRN, Reinaldo Reyes M.D.    hydrALAZINE (Apresoline) injection 10 mg, 10 mg, Intravenous, Q2HRS PRN, Reinaldo Reyes M.D.    niCARdipine (Cardene) 25 mg in  mL Standard Infusion, 0-15 mg/hr, Intravenous, Continuous, Reinaldo Reyes M.D.    [MAR Hold] iohexol (OMNIPAQUE) 300 mg/mL, 50 mL, Intra-arterial, Once, Chandan Pierce M.D.    HYDROmorphone (Dilaudid) injection 0.5-1 mg, 0.5-1 mg, Intravenous, Q3HRS PRN, Marcos Reyez M.D., 1 mg at 02/14/25 0753    Allergies:    Patient has no known allergies.    Past Surgical History:    History reviewed. No pertinent surgical history.    Past Medical History:    History reviewed. No pertinent past medical history.    Social History:    Social History     Socioeconomic History    Marital status: Single     Spouse name: Not on file    Number of children: Not on file    Years of education: Not on file    Highest education level: Not on file   Occupational History    Not on file   Tobacco Use    Smoking status: Never    Smokeless tobacco: Never   Vaping Use    Vaping status: Never Used   Substance and Sexual Activity    Alcohol use: No    Drug use: Yes     Types: Marijuana, Inhaled    Sexual activity: Not on file   Other Topics Concern    Behavioral problems Not Asked    Interpersonal relationships Not Asked    Sad or not enjoying activities Not Asked    Suicidal thoughts Not Asked    Poor school performance Not Asked    Reading difficulties Not Asked    Speech difficulties Not Asked    Writing difficulties Not Asked    Inadequate sleep Not Asked    Excessive TV viewing Not Asked    Excessive video game use Not Asked    Inadequate exercise Not Asked    Sports related Not Asked    Poor diet Not Asked    Family concerns for drug/alcohol abuse Not Asked    Poor oral hygiene Not Asked    Bike safety Not Asked    Family  "concerns vehicle safety Not Asked   Social History Narrative    Not on file     Social Drivers of Health     Financial Resource Strain: Not on file   Food Insecurity: Not on file   Transportation Needs: Not on file   Physical Activity: Not on file   Stress: Not on file   Social Connections: Not on file   Intimate Partner Violence: Not on file   Housing Stability: Not on file       Family History:    History reviewed. No pertinent family history.    Review of System:    Review of Systems   Unable to perform ROS: Acuity of condition       Physical Examination:    /58   Pulse 76   Temp 36.1 °C (97 °F) (Temporal)   Resp 16   Ht 1.803 m (5' 10.98\")   Wt 63.5 kg (140 lb)   SpO2 95%   BMI 19.53 kg/m²   Physical Exam  Constitutional:       General: He is not in acute distress.     Appearance: He is not diaphoretic.   HENT:      Head: Normocephalic and atraumatic.   Eyes:      Pupils: Pupils are equal, round, and reactive to light.   Cardiovascular:      Comments: Sinus rhythm  Pulmonary:      Breath sounds: No wheezing or rales.   Abdominal:      General: There is no distension.      Tenderness: There is no abdominal tenderness.   Musculoskeletal:         General: Normal range of motion.      Cervical back: Neck supple. No rigidity.      Right lower leg: No edema.      Left lower leg: No edema.   Skin:     General: Skin is warm.   Neurological:      General: No focal deficit present.      Mental Status: He is oriented to person, place, and time.      Cranial Nerves: No cranial nerve deficit.         Laboratory Data:        Recent Labs     02/14/25  0516   WBC 9.1   RBC 5.22   HEMOGLOBIN 16.3   HEMATOCRIT 47.7   MCV 91.4   MCH 31.2   MCHC 34.2   RDW 40.7   PLATELETCT 246   MPV 9.8     Recent Labs     02/14/25  0516   SODIUM 140   POTASSIUM 3.6   CHLORIDE 103   CO2 25   GLUCOSE 140*   BUN 11   CREATININE 1.04   CALCIUM 9.5                   Imaging:    I personally viewed all the available CXR and CT scan images " "as well as reviewed the radiology interpretation reports.    DX-CHEST-PORTABLE (1 VIEW)   Final Result         1.  No acute cardiopulmonary disease.      CT-CTA NECK WITH & W/O-POST PROCESSING   Final Result         1.  CT angiogram of the neck within normal limits.         CT-CTA HEAD WITH & W/O-POST PROCESS   Final Result         1.  Segmental area of proximal right M3 narrowing suggesting component of occlusion or vasospasm      These findings were discussed with the patient's clinician, Juan Youssef, on 2/14/2025 5:38 AM.      CT-CEREBRAL PERFUSION ANALYSIS   Final Result         1. Cerebral blood flow less than 30% possibly representing completed infarct = 0 mL. Based on distribution of this finding, this is unlikely to represent artifact.      2. T Max more than 6 seconds possibly representing combination of completed infarct and ischemia = 31 mL. Based on the distribution of this finding, this is unlikely to represent artifact.      3. Mismatched volume possibly representing ischemic brain/penumbra= 31 mL      4.  Please note that this cerebral perfusion study and report is Quantitative and targets supratentorial (cerebral) perfusion for evaluation of large vessel territory acute ischemia/infarction. For example, lacunar infarcts, and brainstem/posterior fossa    ischemia/infarction are not evaluated on this study.  Data acquisition is subject to artifacts which can yield non-anatomically plausible perfusion maps which may be due to motion, bolus timing, signal to noise ratio, or other technical factors.    Perfusion map abnormalities which show non-anatomic distributions are likely artifact.   This study is not \"stand-alone\" and should only be utilized for diagnosis, management/treatment in correlation with CT, CTA, and/or MRI and clinical factors.         CT-HEAD W/O   Final Result         1.  No acute intracranial abnormality.               IR-THROMBO MECHANICAL ARTERY,INIT    (Results Pending) "   EC-ECHOCARDIOGRAM COMPLETE W/O CONT    (Results Pending)       Assessment and Plan:    * Acute ischemic stroke (HCC)- (present on admission)  Assessment & Plan  CT imaging with distal right M2 thrombus  S/P tenecteplase at 0539 hrs today  Emergent angiogram revealed TICI-2b flow after tenecteplase - no thrombectomy was necessary  Neuro checks every hour  Check glycohemoglobin, lipid panel, echocardiogram, hereditary thrombophilia panel, urine drug screen, MRI brain  Strict blood pressure control with goal -140  High intensity statin    Hypokalemia  Assessment & Plan  Replete potassium    Migraines  Assessment & Plan  History of        High risk of deterioration and worsening vital organ dysfunction and death without the above critical care interventions.    I have assessed and reassessed his respiratory status, blood pressure, hemodynamics, cardiovascular status and neurologic status.  He is at increased risk for worsening CNS system dysfunction.    The patient is critically ill.  Critical care time = 35 minutes in directly providing and coordinating critical care and extensive data review.  No time overlap and excludes procedures.    Discussed with RN    Marcos Reyez MD  Pulmonary and Critical Care Medicine

## 2025-02-14 NOTE — DISCHARGE PLANNING
[facility] Transitional Care Coordination    Referral from: Dr. Reyez    Insurance Provider on Facesheet: [employer]    Potential Rehab Diagnosis: CVA    Chart review indicates patient may have on going medical management and may have therapy needs to possibly meet inpatient rehab facility criteria with the goal of returning to community.    D/C support will need to be verified: Mother    Physiatry consultation pended per protocol.  W/U & TX pending.     Thank you for the referral.

## 2025-02-14 NOTE — ED PROVIDER NOTES
ED Provider Note    CHIEF COMPLAINT  Chief Complaint   Patient presents with    Possible Stroke    Facial Droop    Weakness             HPI/ROS    OUTSIDE HISTORIAN(S):  EMS who reports patient with left facial droop, left sided weakness which improved during transport.  Normal glucose    Daniel Hastings is a 24 y.o. male who presents with chief complaint of weakness.  Patient with a history of migraines with aura but never had a atypical migraine.  He presents today with weakness.  Patient woke up this morning feeling weak, most pronounced on the left side.  Patient had associated facial droop which was noticed by his partner.  Patient reports that he had significant weakness there is diffuse but more pronounced on left than right.  Symptoms appear to be improving.  Patient smokes marijuana last night, he denies any other drug use.  Patient denies any associated head trauma.  He denies use of anticoagulants or antiplatelets.  He denies headache.    PAST MEDICAL HISTORY       SURGICAL HISTORY  patient denies any surgical history    FAMILY HISTORY  No family history on file.    SOCIAL HISTORY  Social History     Tobacco Use    Smoking status: Never    Smokeless tobacco: Never   Substance and Sexual Activity    Alcohol use: No    Drug use: Not Currently     Types: Marijuana    Sexual activity: Not on file       CURRENT MEDICATIONS  Home Medications    **Home medications have not yet been reviewed for this encounter**       Audit from Redirected Encounters    **Home medications have not yet been reviewed for this encounter**         ALLERGIES  No Known Allergies    PHYSICAL EXAM  VITAL SIGNS: There were no vitals taken for this visit.   Physical Exam  Constitutional:       Appearance: Normal appearance.   HENT:      Head: Normocephalic.      Right Ear: Tympanic membrane normal.      Left Ear: Tympanic membrane normal.      Nose: Nose normal.      Mouth/Throat:      Mouth: Mucous membranes are moist.   Eyes:       Extraocular Movements: Extraocular movements intact.      Pupils: Pupils are equal, round, and reactive to light.   Cardiovascular:      Rate and Rhythm: Normal rate and regular rhythm.   Pulmonary:      Effort: Pulmonary effort is normal. No respiratory distress.      Breath sounds: Normal breath sounds. No stridor. No wheezing or rales.   Chest:      Chest wall: No tenderness.   Abdominal:      General: Abdomen is flat. There is no distension.      Palpations: Abdomen is soft. There is no mass.      Tenderness: There is no abdominal tenderness.   Musculoskeletal:      Cervical back: Normal range of motion.   Skin:     General: Skin is warm.      Capillary Refill: Capillary refill takes less than 2 seconds.   Neurological:      Mental Status: He is alert.      Comments: Facial droop noted on left.  Patient with 5 out of 5 strength in bilateral upper and lower extremities.  Sensation intact throughout.  No visual field deficit.  No speech paucity, no dysarthria.  Unsure of the date but otherwise alert and oriented.   Psychiatric:         Mood and Affect: Mood normal.           EKG/LABS  Results for orders placed or performed during the hospital encounter of 02/14/25   CBC WITH DIFFERENTIAL    Collection Time: 02/14/25  5:16 AM   Result Value Ref Range    WBC 9.1 4.8 - 10.8 K/uL    RBC 5.22 4.70 - 6.10 M/uL    Hemoglobin 16.3 14.0 - 18.0 g/dL    Hematocrit 47.7 42.0 - 52.0 %    MCV 91.4 81.4 - 97.8 fL    MCH 31.2 27.0 - 33.0 pg    MCHC 34.2 32.3 - 36.5 g/dL    RDW 40.7 35.9 - 50.0 fL    Platelet Count 246 164 - 446 K/uL    MPV 9.8 9.0 - 12.9 fL    Neutrophils-Polys 31.30 (L) 44.00 - 72.00 %    Lymphocytes 57.40 (H) 22.00 - 41.00 %    Monocytes 7.80 0.00 - 13.40 %    Eosinophils 2.60 0.00 - 6.90 %    Basophils 0.90 0.00 - 1.80 %    Nucleated RBC 0.00 0.00 - 0.20 /100 WBC    Neutrophils (Absolute) 2.85 1.82 - 7.42 K/uL    Lymphs (Absolute) 5.22 (H) 1.00 - 4.80 K/uL    Monos (Absolute) 0.71 0.00 - 0.85 K/uL    Eos  (Absolute) 0.24 0.00 - 0.51 K/uL    Baso (Absolute) 0.08 0.00 - 0.12 K/uL    NRBC (Absolute) 0.00 K/uL   COMP METABOLIC PANEL    Collection Time: 02/14/25  5:16 AM   Result Value Ref Range    Sodium 140 135 - 145 mmol/L    Potassium 3.6 3.6 - 5.5 mmol/L    Chloride 103 96 - 112 mmol/L    Co2 25 20 - 33 mmol/L    Anion Gap 12.0 7.0 - 16.0    Glucose 140 (H) 65 - 99 mg/dL    Bun 11 8 - 22 mg/dL    Creatinine 1.04 0.50 - 1.40 mg/dL    Calcium 9.5 8.5 - 10.5 mg/dL    Correct Calcium 9.3 8.5 - 10.5 mg/dL    AST(SGOT) 26 12 - 45 U/L    ALT(SGPT) 16 2 - 50 U/L    Alkaline Phosphatase 72 30 - 99 U/L    Total Bilirubin 0.5 0.1 - 1.5 mg/dL    Albumin 4.3 3.2 - 4.9 g/dL    Total Protein 7.3 6.0 - 8.2 g/dL    Globulin 3.0 1.9 - 3.5 g/dL    A-G Ratio 1.4 g/dL   PROTHROMBIN TIME    Collection Time: 02/14/25  5:16 AM   Result Value Ref Range    PT 14.4 12.0 - 14.6 sec    INR 1.11 0.87 - 1.13   APTT    Collection Time: 02/14/25  5:16 AM   Result Value Ref Range    APTT 24.0 (L) 24.7 - 36.0 sec   COD (ADULT)    Collection Time: 02/14/25  5:16 AM   Result Value Ref Range    ABO Grouping Only O     Rh Grouping Only POS     Antibody Screen-Cod NEG    TROPONIN    Collection Time: 02/14/25  5:16 AM   Result Value Ref Range    Troponin T <6 6 - 19 ng/L   ESTIMATED GFR    Collection Time: 02/14/25  5:16 AM   Result Value Ref Range    GFR (CKD-EPI) 102 >60 mL/min/1.73 m 2   Hemoglobin A1C    Collection Time: 02/14/25  5:16 AM   Result Value Ref Range    Glycohemoglobin 6.1 (H) 4.0 - 5.6 %    Est Avg Glucose 128 mg/dL   Lipid Profile    Collection Time: 02/14/25  5:16 AM   Result Value Ref Range    Cholesterol,Tot 125 100 - 199 mg/dL    Triglycerides 81 0 - 149 mg/dL    HDL 46 >=40 mg/dL    LDL 63 <100 mg/dL   DIFFERENTIAL MANUAL    Collection Time: 02/14/25  5:16 AM   Result Value Ref Range    Manual Diff Status PERFORMED     Comment See Comment    PERIPHERAL SMEAR REVIEW    Collection Time: 02/14/25  5:16 AM   Result Value Ref Range     Peripheral Smear Review see below    PLATELET ESTIMATE    Collection Time: 25  5:16 AM   Result Value Ref Range    Plt Estimation Normal    MORPHOLOGY    Collection Time: 25  5:16 AM   Result Value Ref Range    RBC Morphology Present     Smudge Cells Few    EKG (NOW)    Collection Time: 25  5:48 AM   Result Value Ref Range    Report       [facility] Emergency Dept.    Test Date:  2025  Pt Name:    CHINEDU BOJORQUEZ              Department: ER  MRN:        [id number]                      Room:       [facility]  Gender:     Male                         Technician: [id number]  :        2000                   Requested By:BLANCA ENG  Order #:    [id number]                    Reading MD: Mikael Martinez MD    Measurements  Intervals                                Axis  Rate:       66                           P:          73  MN:         168                          QRS:        72  QRSD:       90                           T:          70  QT:         426  QTc:        447    Interpretive Statements  EKG is normal sinus rhythm, normal axis normal intervals, J-point elevation  is present  Electronically Signed On 2025 05:48:01 PST by Mikael Martinez MD     ABO Rh Confirm    Collection Time: 25  6:14 AM   Result Value Ref Range    ABO Rh Confirm O POS        I have independently interpreted this EKG    RADIOLOGY/PROCEDURES   I have independently interpreted the diagnostic imaging associated with this visit and am waiting the final reading from the radiologist.   My preliminary interpretation is as follows: Possible LVO in R M3, corresponding evidence of ischemia on perfusion    Radiologist interpretation:  IR-THROMBO MECHANICAL ARTERY,INIT   Final Result      1.  Diagnostic angiogram demonstrates occlusion of few right frontal and right parietal M4 branches indicating recanalization of previously seen M3 occlusion likely from the intravenous TNK. The mechanical thrombectomy  "is not indicated.   2.  TICI 2c      DX-CHEST-PORTABLE (1 VIEW)   Final Result         1.  No acute cardiopulmonary disease.      CT-CTA NECK WITH & W/O-POST PROCESSING   Final Result         1.  CT angiogram of the neck within normal limits.         CT-CTA HEAD WITH & W/O-POST PROCESS   Final Result         1.  Segmental area of proximal right M3 narrowing suggesting component of occlusion or vasospasm      These findings were discussed with the patient's clinician, Juan Youssef, on 2/14/2025 5:38 AM.      CT-CEREBRAL PERFUSION ANALYSIS   Final Result         1. Cerebral blood flow less than 30% possibly representing completed infarct = 0 mL. Based on distribution of this finding, this is unlikely to represent artifact.      2. T Max more than 6 seconds possibly representing combination of completed infarct and ischemia = 31 mL. Based on the distribution of this finding, this is unlikely to represent artifact.      3. Mismatched volume possibly representing ischemic brain/penumbra= 31 mL      4.  Please note that this cerebral perfusion study and report is Quantitative and targets supratentorial (cerebral) perfusion for evaluation of large vessel territory acute ischemia/infarction. For example, lacunar infarcts, and brainstem/posterior fossa    ischemia/infarction are not evaluated on this study.  Data acquisition is subject to artifacts which can yield non-anatomically plausible perfusion maps which may be due to motion, bolus timing, signal to noise ratio, or other technical factors.    Perfusion map abnormalities which show non-anatomic distributions are likely artifact.   This study is not \"stand-alone\" and should only be utilized for diagnosis, management/treatment in correlation with CT, CTA, and/or MRI and clinical factors.         CT-HEAD W/O   Final Result         1.  No acute intracranial abnormality.               EC-ECHOCARDIOGRAM COMPLETE W/O CONT    (Results Pending)     CRITICAL CARE  The very " real possibilty of a deterioration of this patient's condition required the highest level of my preparedness for sudden, emergent intervention.  I provided critical care services, which included medication orders, frequent reevaluations of the patient's condition and response to treatment, ordering and reviewing test results, and discussing the case with various consultants.  The critical care time associated with the care of the patient was 45 minutes. Review chart for interventions. This time is exclusive of any other billable procedures.       COURSE & MEDICAL DECISION MAKING    ASSESSMENT, COURSE AND PLAN  Care Narrative: Patient here with rapidly improving neurologic symptoms.  Concern for possible CVA, he does have associated left facial droop.  Given his history of migraine with aura is very likely that this is a complex migraine especially in this young otherwise healthy patient.  Patient will be sent to CTA head and neck with perfusion emergently.  Patient seen emergently by neurology initially upon his presentation to the emergency department.    NIH of 3    Patient CT perfusion is concerning for CVA.  CTA reveals right M3 occlusion.  Patient consented for TNK, he does not have any immediate hard contraindications.  Neurology also consented patient for TNK.  Patient blood pressure in acceptable range  Basic Labs are unrevealing  Pharmacy has been involved and patient is now receiving TNK  Patient case discussed with intensive care    Patient transferred to the ICU        DISPOSITION AND DISCUSSIONS  I have discussed management of the patient with the following physicians and KYARA's:  Dr. Pierce of neurology, [facility]        Barriers to care at this time, including but not limited to: Patient does not have established PCP.         FINAL DIAGNOSIS  1. Cerebrovascular accident (CVA), unspecified mechanism (HCC)        2. Left-sided weakness        3. Acute ischemic stroke (HCC)  Referral to  Physiatry (PMR)    Referral to Neurology

## 2025-02-14 NOTE — ASSESSMENT & PLAN NOTE
Presented with left sided weakness and mild confusion  CT imaging with distal right M2 thrombus  S/P tenecteplase at 0539 hrs 2/14  Emergent angiogram revealed TICI-2b flow after tenecteplase - no thrombectomy was necessary  NIH 0 currently   Echo normal EF and no evidence of interatrial shunt   -Pending MRI brain  -Neuro checks every 4h  -Strict blood pressure control with goal -140  -Aspirin, statin  -Pending hypercoagulable state work up and LATRICIA  -PT/OT per protocol

## 2025-02-14 NOTE — OR NURSING
Neuro assessment WDL. Right groin site soft ; gauze and tegaderm dressing with scant sanguinous discharge. Patient transported to RICU on monitor with ACLS RN, on 2L oxygen NC with full oxygen tank. Handoff completed with RICU RN Jessa, opportunity for questions provided. No patient belongings in gloria-op area. Patient's girlfriend Elizabeth updated via phone.

## 2025-02-14 NOTE — CONSULTS
Neurology STROKE CODE H&P  Neurohospitalist Service, [facility]    Referring Physician: Juan Youssef M.D.    STROKE CODE: Left side weakness, slurred speech    To obtain the most accurate data regarding the time called, and time patient seen, refer to the stroke run-sheet and chart.  For time of CT, refer to the radiology report. See A&P below for TPA Decision and door to needle time if and when applicable.    HPI: Daniel Hastings is a 24 year old man with history of migraines with aura, presenting with L side weakness and slurred speech.  He reports going to bed normal around midnight, then tried to get up out of bed to get a drink of water when he felt weak.  He slumped to the ground.  No LOC.  His partner noted that he had slurred speech and appeared weaker on the left.  EMS was called, and they noted similar symptoms on their arrival.  SBPs in 110s, FSBS 125.   reports he smoked some pot last night, but denies drinking or other recreational drug use.  On ROS, denies headache, does endorse some nausea.  No infectious prodrome, no constitutional symptoms.    Review of systems: In addition to what is detailed in the HPI above, all other systems reviewed and are negative.    Past Medical History:   Migraines    FHx:  No known family history of stroke or blood clotting disorder    SHx:   reports that he has never smoked. He has never used smokeless tobacco. He reports that he does not currently use drugs after having used the following drugs: Marijuana. He reports that he does not drink alcohol.    Allergies:  No Known Allergies    Medications:  No current facility-administered medications for this encounter.  No current outpatient medications on file.    Physical Examination:    Vitals:    02/14/25 0500 02/14/25 0530 02/14/25 0531 02/14/25 0544   BP:  126/71 130/76 123/81   Pulse:  64 62 65   Resp:    (!) 9   SpO2:  96% 95% 95%   Weight: 63.5 kg (140 lb)      Height: 1.803 m (5'  "10.98\")            General: Patient is awake and in no acute distress  Eye: Examination of optic disks not indicated at this time given acuity of consult  Neck: There is normal range of motion  CV: Regular rate   Extremities:  Clear, dry, intact, without peripheral edema    NEUROLOGICAL EXAM:     Mental status: Awake, alert   Speech and language: Speech is clear and fluent. Disoriented to month.  Able to tell me home city.  Follows midline and distal commands.  Cranial nerve exam:  Visual fields are full. There is no nystagmus. Extraocular muscles are intact. Left lower face droop   Motor exam: There is sustained antigravity with no downward drift in bilateral arms and legs.    Sensory exam:  Reacts to tactile in all 4 distal extremities, there is no neglect to double stim.  Coordination: No ataxia on bilateral finger-to-nose testing.  Gait: Deferred due to patient preference.    NIHSS: National Institutes of Health Stroke Scale    [0] 1a:Level of Consciousness    0-alert 1-drowsy   2-stupor   3-coma  [1] 1b:LOC Questions                  0-both  1-one      2-neither  [0] 1c:LOC Commands                   0-both  1-one      2-neither  [0] 2: Best Gaze                     0-nl    1-partial  2-forced  [0] 3: Visual Fields                   0-nl    1-partial  2-complete 3-bilat  [2] 4: Facial Paresis                0-nl    1-minor    2-partial  3-full  MOTOR                       0-nl  [0] 5: Right Arm           1-drift  [0] 6: Left Arm             2-some effort vs gravity  [0] 7: Right Leg           3-no effort vs gravity  [0] 8: Left Leg             4-no movement                             x-untestable  [0] 9: Limb Ataxia                    0-abs   1-1_limb   2-2+_limbs       x-untestable  [0] 10:Sensory                        0-nl    1-partial  2-dense  [0] 11:Best Language/Aphasia         0-nl    1-mild/mod 2-severe   3-mute  [0] 12:Dysarthria                     0-nl    1-mild/mod 2-severe       x-untestable  [0] " "13:Neglect/Inattention            0-none  1-partial  2-complete  [3] TOTAL    Baseline Modified [place] Scale (MRS): 0 = No symptoms    Objective Data:    Labs:  No results found for: \"PROTHROMBTM\", \"INR\"   No results found for: \"WBC\", \"RBC\", \"HEMOGLOBIN\", \"HEMATOCRIT\", \"MCV\", \"MCH\", \"MCHC\", \"MPV\", \"NEUTSPOLYS\", \"LYMPHOCYTES\", \"MONOCYTES\", \"EOSINOPHILS\", \"BASOPHILS\", \"HYPOCHROMIA\", \"ANISOCYTOSIS\"   Lab Results   Component Value Date/Time    SODIUM 139 09/18/2019 01:00 PM    POTASSIUM 4.0 09/18/2019 01:00 PM    CHLORIDE 105 09/18/2019 01:00 PM    CO2 26 09/18/2019 01:00 PM    GLUCOSE 95 09/18/2019 01:00 PM    BUN 10 09/18/2019 01:00 PM    CREATININE 0.87 09/18/2019 01:00 PM      No results found for: \"CHOLSTRLTOT\", \"LDL\", \"HDL\", \"TRIGLYCERIDE\"    No results found for: \"ALKPHOSPHAT\", \"ASTSGOT\", \"ALTSGPT\", \"TBILIRUBIN\"     Imaging/Testing:    I interpreted and/or reviewed the patient's neuroimaging    CT-CTA NECK WITH & W/O-POST PROCESSING   Final Result         1.  CT angiogram of the neck within normal limits.         CT-CTA HEAD WITH & W/O-POST PROCESS   Final Result         1.  Segmental area of proximal right M3 narrowing suggesting component of occlusion or vasospasm      These findings were discussed with the patient's clinician, Juan Youssef, on 2/14/2025 5:38 AM.      CT-CEREBRAL PERFUSION ANALYSIS   Final Result         1. Cerebral blood flow less than 30% possibly representing completed infarct = 0 mL. Based on distribution of this finding, this is unlikely to represent artifact.      2. T Max more than 6 seconds possibly representing combination of completed infarct and ischemia = 31 mL. Based on the distribution of this finding, this is unlikely to represent artifact.      3. Mismatched volume possibly representing ischemic brain/penumbra= 31 mL      4.  Please note that this cerebral perfusion study and report is Quantitative and targets supratentorial (cerebral) perfusion for evaluation of large vessel " "territory acute ischemia/infarction. For example, lacunar infarcts, and brainstem/posterior fossa    ischemia/infarction are not evaluated on this study.  Data acquisition is subject to artifacts which can yield non-anatomically plausible perfusion maps which may be due to motion, bolus timing, signal to noise ratio, or other technical factors.    Perfusion map abnormalities which show non-anatomic distributions are likely artifact.   This study is not \"stand-alone\" and should only be utilized for diagnosis, management/treatment in correlation with CT, CTA, and/or MRI and clinical factors.         CT-HEAD W/O   Final Result         1.  No acute intracranial abnormality.               DX-CHEST-PORTABLE (1 VIEW)    (Results Pending)   IR-THROMBO MECHANICAL ARTERY,INIT    (Results Pending)       Assessment and Plan:  Daniel Hastings is a 24 year old man with migraines, presenting with mild confusion, L face droop.  Stroke protocol CT revealing a R distal M2 thrombus, with two areas of perfusion defect- R frontal and R parietal.  This is a wake up stroke, and he presents slightly out of the therapeutic window for IV-tPA. However head CT is normal without evidence of early infarct, and CT perfusion with no identified core infarct, suggestive that hypoperfused tissue remains at-risk but potentially salvageable.  I discussed risks, benefits, and alternatives to thrombolytic therapy, and  was amenable to proceeding with therapy.  Reviewed CTA with Neuro-IR and this is a potentially retrievable clot- so will proceed with endovascular clot retrieval.  Admit to RICU post-operatively.  Unknown etiology at this time.  No profound headache, and no multifocal stenoses on CTA to suggest reversible cerebral vasoconstriction syndrome (RCVS).  Will check for structural heart disease and presence of PFO.  Will need hypercoagulable workup.  If no clear stroke etiology revealed on diagnostic studies- consider migrainous infarct- " however he is not currently having his typical migraine which is a typical requirement for this diagnosis.    Problem list:   R MCA stroke   Migraine with auras    Recommendations:    - received IV-TNK at 0539   - will proceed to IR for endovascular clot retrieval   - HOB flat until IR procedure   - admit to RICU post-operatively, neurochecks/NIHSS per post-TNK/post-thrombectomy protocol   - expedite MRI brain without contrast, does not need to wait 24 hours   - repeat head CT only if there is clinical deterioration   - post-operative BP goal TBD pending TICI result as follows:    If TICI 3/2c: maintain systolic -140  If TICI 2b: maintain systolic 120-160  If TICI 2a or less, maintain systolic -180   - stroke labs:  HgbA1c and lipid panel  - start atorvastatin 40mg daily for goal LDL < 70, titrate dose to LDL results  - attain TTE with bubble study, ziopatch monitoring at discharge  - send hypercoagulable panel:  Factor V, Factor II, antithrombin III, antiphospholipid panel  - PT/OT/SLP ok within first 24 hours    Patient discussed with Dr. Youssef, ER attending, Dr. Reyes, Neuro-IR attending, and Dr. Mitchell, ICU attending    Chandan Pierce MD  Vascular Neurology

## 2025-02-14 NOTE — ASSESSMENT & PLAN NOTE
Patient presents with largely reassuring laboratory evaluation.  However, he does have elevated lymphocytes (57%, 5.22 absolute number) and smudge cells present on smear.  He does report night sweats but denies weight loss.  No lymphadenopathy present on physical exam.  -Continue further lymphoma/leukemia workup with flow cytometry, etc.

## 2025-02-14 NOTE — PROGRESS NOTES
Day intensivist note.     Reason for admission was reviewed  Received hand off from Dr. Reyez    23yo male with hx of migraine with aura presenting with left-sided weakness started this morning.  Patient received TNKase.  Initial NIH was 3.  Taken to IR and angiogram did not show any need for thrombectomy given TICI 2C flow in the right proximal M3.  No history of prior stroke, DVT, PE.  Reportedly patient had a mild slurred speech left facial droop.  After TNKase symptoms have resolved    ICU course:   Pt is alert, oriented.   Blood pressure in 120s   Heart rate in 60s to 80s.  Normal sinus rhythm.  On room air   Afebrile  Nonfocal exam  Tolerating oral intake.    Labs and imaging were reviewed  A1c 6.1    Assessment:   Right M2  Hx of migraine with aura    Plan:   Post TNKase protocol with frequent neurochecks  Goal -140   MRI brain w/wo contrast  TTE with bubble study  Follow up Hypercoagulable panel   PT/OT/SLP  Rehab consult  Appreciate neuro input.  Discussed with Dr. Pierce  Urine drug screen    The patient remains critically ill. Critical care time = 45 mins in directly providing and coordinating critical care and extensive data review. No time overlap and excludes procedures.     Eliezer Higgins D.O.

## 2025-02-14 NOTE — PROGRESS NOTES
4 Eyes Skin Assessment Completed by MICHELLE Germain and MICHELLE Tang.    Head WDL  Ears WDL  Nose WDL  Mouth WDL  Neck WDL  Breast/Chest WDL  Shoulder Blades WDL  Spine WDL  (R) Arm/Elbow/Hand WDL  (L) Arm/Elbow/Hand WDL  Abdomen WDL  Groin Incision  Scrotum/Coccyx/Buttocks Redness and Blanching  (R) Leg WDL  (L) Leg Scar, left knee  (R) Heel/Foot/Toe WDL  (L) Heel/Foot/Toe WDL          Devices In Places ECG, Tele Box, Blood Pressure Cuff, Pulse Ox, SCD's, and Nasal Cannula      Interventions In Place Gray Ear Foams, Pillows, Q2 Turns, and Heels Loaded W/Pillows    Possible Skin Injury No    Pictures Uploaded Into Epic N/A  Wound Consult Placed N/A  RN Wound Prevention Protocol Ordered No

## 2025-02-14 NOTE — CARE PLAN
The patient is Watcher - Medium risk of patient condition declining or worsening    Shift Goals  Clinical Goals: post TNK neuro checks, mobilize as able  Patient Goals: to eat  Family Goals: updates at bedside    Progress made toward(s) clinical / shift goals:       Problem: Pain - Standard  Goal: Alleviation of pain or a reduction in pain to the patient’s comfort goal  Outcome: Progressing     Problem: Optimal Care of the Stroke Patient  Goal: Optimal acute care for the stroke patient  Outcome: Progressing     Problem: Knowledge Deficit - Stroke Education  Goal: Patient's knowledge of stroke and risk factors will improve  Outcome: Progressing     Problem: Discharge Planning - Stroke  Goal: Ensure Stroke Core Measures are met prior to discharge  Outcome: Progressing     Problem: Neuro Status  Goal: Neuro status will remain stable or improve  Outcome: Progressing     Problem: Hemodynamic Monitoring  Goal: Patient's hemodynamics, fluid balance and neurologic status will be stable or improve  Outcome: Progressing     Problem: Dysphagia  Goal: Dysphagia will improve  Outcome: Progressing     Problem: Mobility - Stroke  Goal: Patient's capacity to carry out activities will improve  Outcome: Progressing       Patient is not progressing towards the following goals: NA

## 2025-02-14 NOTE — ASSESSMENT & PLAN NOTE
Patient presenting with left-sided weakness, facial droop, and numbness concerning for acute stroke.  CTA revealing proximal occlusion of M3.  Tenecteplase given 0539 with resolution in stroke symptoms.  Mechanical thrombectomy considered by IR but not performed due to TICI IIc flow.  Presentation concerning for onset of stroke in a healthy appearing individual of his age, prompting workup for hypercoagulability per neurology.  -Blood pressure target range 100-140 with nicardipine drip and as needed hydralazine  -Neurology consult, appreciate recs  -neurochecks/NIHSS per post-TNK/post-thrombectomy protocol  -Brain MRI without contrast  -Repeat head CT only if deterioration  -Hemoglobin A1c  -Atorvastatin 40 daily, goal LDL less than 70  -TTE with bubble study  -Zio patch monitoring at discharge  -Hypercoagulable panel sent  -PT/OT/SLP okay within first 24 hours

## 2025-02-14 NOTE — CONSULTS
Critical Care/Pulmonary Consultation    Date of Service: 2/14/2025    Date of Admission:  2/14/2025  5:09 AM    Consulting Physician: REYNA Juarez*    Chief Complaint:  Possible Stroke, Facial Droop, and Weakness      History of Present Illness: Daniel Hastings is a 24 y.o. male with a medical history of migraines with aura presenting with sudden left-sided weakness onset in the a.m. on 2/14.  Last known well 2359 2/13.  Upon waking up in the morning, he tried to push with his arms to stand up out of bed but was unable to push with his left arm and he fell to the floor.  He denies hitting his head or losing consciousness.  He was unable to walk unassisted after this due to left leg weakness.  His girlfriend noticed that he was having left-sided facial droop at this time.  He reports that he tried to drink water but was unable to swallow it, coughing it up. Per EMS he was confused, reported he could not feel his left hand/arm, had left facial droop, and slurred speech. EMS recorded SBP in the 110s and a glucose of 125, and observed left motor drift and ataxia.  On arrival to the ED, he did not know the month. NIH score 3, GCS 15.      ED course: On arrival, T 36.7, HR 65, /81, saturating well on room air. CT head w/o bleed. CTA demonstrating segmental proximal M3 narrowing suggestive of occlusion or vasospasm. CXR w/o acute process. CBC wnl.  He was evaluated by neurology in the ED.  Tenecteplase given at 0539 and he was taken to IR for possible thrombectomy which was not necessary due to TICI 2c flow in the proximal M3.  On our interview with him, post-IR evaluation, his symptoms had completely resolved.    Over the last few years, he has reported dizziness on standing up in the morning if he stands up too quickly.  He does describe this as a sensation of the room spinning and it is accompanied by a bitemporal headache that he describes as a migraine and double vision.  He denies other visual  symptoms.  He denies having anything like the weakness he experienced this morning in the past.  He denies recent leg swelling, leg pain, recent surgery, or recent travel.  He reports that over the last 2 weeks he has lost his voice secondary to a runny nose and a cough productive of white sputum.  He also has noted night sweats that soaked through his clothing and to the bed, making him need to change his clothes.  He denies a family history of DVT, PE, or autoimmune conditions.  Non-smoker, no alcohol use, daily marijuana use, last use at 2300 2/13    Review of Systems   Constitutional:  Positive for diaphoresis. Negative for chills, fever and weight loss.   HENT:  Positive for congestion. Negative for sinus pain and sore throat.    Eyes:  Positive for blurred vision.   Respiratory:  Positive for cough. Negative for shortness of breath and wheezing.    Cardiovascular:  Negative for chest pain, palpitations, orthopnea and leg swelling.   Gastrointestinal:  Negative for abdominal pain, blood in stool, constipation, diarrhea, nausea and vomiting.   Genitourinary:  Negative for dysuria.   Musculoskeletal:  Positive for falls. Negative for neck pain.   Neurological:  Positive for sensory change, focal weakness, weakness and headaches. Negative for loss of consciousness.       Home Medications       Reviewed by Thom Aguilera (Pharmacy Tech) on 02/14/25 at 0635  Med List Status: Complete     Medication Last Dose Status   acetaminophen (TYLENOL) 325 MG Tab 2/11/2025 Active                  Audit from Redirected Encounters    **Home medications have not yet been reviewed for this encounter**         Social History     Tobacco Use    Smoking status: Never    Smokeless tobacco: Never   Vaping Use    Vaping status: Never Used   Substance Use Topics    Alcohol use: No    Drug use: Yes     Types: Marijuana, Inhaled        History reviewed. No pertinent past medical history.    History reviewed. No pertinent surgical  "history.    Allergies: Patient has no known allergies.    History reviewed. No pertinent family history.    Vitals:    02/14/25 0500 02/14/25 0530 02/14/25 0531 02/14/25 0544   Height: 1.803 m (5' 10.98\")      Weight: 63.5 kg (140 lb)      Weight % change since last entry.: 0 %      BP:  126/71 130/76 123/81   Pulse:  64 62 65   BMI (Calculated): 19.53      Resp:    (!) 9   Temp:  36.7 °C (98.1 °F)     TempSrc:        02/14/25 0601 02/14/25 0616 02/14/25 0630 02/14/25 0631   Height:       Weight:       Weight % change since last entry.:       BP: 124/76 118/74  120/75   Pulse: 65 61 (!) 56 63   BMI (Calculated):       Resp: 19 15 19 (!) 22   Temp:       TempSrc:        02/14/25 0635 02/14/25 0645 02/14/25 0650 02/14/25 0655   Height:       Weight:       Weight % change since last entry.:       BP: 121/70 121/70 120/71 120/71   Pulse: 73 60 73 67   BMI (Calculated):       Resp: 18      Temp:       TempSrc:        02/14/25 0700 02/14/25 0704 02/14/25 0705 02/14/25 0710   Height:       Weight:       Weight % change since last entry.:       BP: 121/73 125/72  130/81   Pulse: 74  68 66   BMI (Calculated):       Resp:       Temp:       TempSrc:        02/14/25 0718 02/14/25 0730 02/14/25 0745 02/14/25 0800   Height:       Weight:       Weight % change since last entry.:       BP: 127/70 109/58 122/71 115/62   Pulse: 66 76 (!) 55 64   BMI (Calculated):       Resp: 16      Temp: 36.1 °C (97 °F)      TempSrc: Temporal       02/14/25 0815 02/14/25 0820 02/14/25 0845 02/14/25 0900   Height:   1.803 m (5' 11\")    Weight:   58.4 kg (128 lb 12 oz)    Weight % change since last entry.:   -8.04 %    BP: (!) 134/100 114/67 117/74 139/79   Pulse: 92 73 89 84   BMI (Calculated):   17.96    Resp:    (!) 23   Temp:   36.3 °C (97.4 °F)    TempSrc:   Temporal     02/14/25 0930 02/14/25 1000 02/14/25 1030 02/14/25 1100   Height:       Weight:       Weight % change since last entry.:       BP: 126/73 126/78 116/64 125/79   Pulse: 73 68 78 " 94   BMI (Calculated):       Resp: 14 13 18 (!) 23   Temp:  36.1 °C (97 °F)     TempSrc:  Temporal      02/14/25 1130 02/14/25 1200 02/14/25 1230 02/14/25 1300   Height:       Weight:       Weight % change since last entry.:       BP: (!) 128/92 114/64 (!) 127/94 123/76   Pulse: 68 72 77 83   BMI (Calculated):       Resp: (!) 22 18 12 (!) 23   Temp:  36.9 °C (98.5 °F)     TempSrc:  Temporal         Physical Examination  Physical Exam  Vitals and nursing note reviewed.   Constitutional:       General: He is not in acute distress.     Appearance: Normal appearance. He is not ill-appearing.   HENT:      Head: Normocephalic and atraumatic.      Right Ear: External ear normal.      Left Ear: External ear normal.      Nose: No congestion or rhinorrhea.      Mouth/Throat:      Mouth: Mucous membranes are moist.   Eyes:      General: No scleral icterus.     Extraocular Movements: Extraocular movements intact.      Pupils: Pupils are equal, round, and reactive to light.   Neck:      Comments: No lymphadenopathy in anterior or posterior cervical, supraclavicular, infraclavicular, or axillary regions  Cardiovascular:      Rate and Rhythm: Normal rate and regular rhythm.      Heart sounds: No murmur heard.     No friction rub. No gallop.   Pulmonary:      Effort: No respiratory distress.      Breath sounds: No wheezing, rhonchi or rales.   Abdominal:      General: There is no distension.      Tenderness: There is no abdominal tenderness. There is no guarding or rebound.   Musculoskeletal:         General: No swelling.      Cervical back: No rigidity.   Lymphadenopathy:      Cervical: No cervical adenopathy.   Skin:     Capillary Refill: Capillary refill takes less than 2 seconds.      Coloration: Skin is not jaundiced.   Neurological:      General: No focal deficit present.      Mental Status: He is alert and oriented to person, place, and time. Mental status is at baseline.      Cranial Nerves: No cranial nerve deficit.       Sensory: No sensory deficit.      Motor: No weakness.      Coordination: Coordination normal.      Comments: Cranial nerves II through XII intact, 5 out of 5 strength in all extremities.  2+ reflexes in all extremities   Psychiatric:         Mood and Affect: Mood normal.         Behavior: Behavior normal.          No intake or output data in the 24 hours ending 02/14/25 0721    Recent Labs     02/14/25  0516   WBC 9.1   NEUTSPOLYS 31.30*   LYMPHOCYTES 57.40*   MONOCYTES 7.80   EOSINOPHILS 2.60   BASOPHILS 0.90   ASTSGOT 26   ALTSGPT 16   ALKPHOSPHAT 72   TBILIRUBIN 0.5     Recent Labs     02/14/25  0516   SODIUM 140   POTASSIUM 3.6   CHLORIDE 103   CO2 25   BUN 11   CREATININE 1.04   CALCIUM 9.5     Recent Labs     02/14/25  0516   ALTSGPT 16   ASTSGOT 26   ALKPHOSPHAT 72   TBILIRUBIN 0.5   GLUCOSE 140*         IR-THROMBO MECHANICAL ARTERY,INIT   Final Result      1.  Diagnostic angiogram demonstrates occlusion of few right frontal and right parietal M4 branches indicating recanalization of previously seen M3 occlusion likely from the intravenous TNK. The mechanical thrombectomy is not indicated.   2.  TICI 2c      DX-CHEST-PORTABLE (1 VIEW)   Final Result         1.  No acute cardiopulmonary disease.      CT-CTA NECK WITH & W/O-POST PROCESSING   Final Result         1.  CT angiogram of the neck within normal limits.         CT-CTA HEAD WITH & W/O-POST PROCESS   Final Result         1.  Segmental area of proximal right M3 narrowing suggesting component of occlusion or vasospasm      These findings were discussed with the patient's clinician, Juan Youssef, on 2/14/2025 5:38 AM.      CT-CEREBRAL PERFUSION ANALYSIS   Final Result         1. Cerebral blood flow less than 30% possibly representing completed infarct = 0 mL. Based on distribution of this finding, this is unlikely to represent artifact.      2. T Max more than 6 seconds possibly representing combination of completed infarct and ischemia = 31 mL. Based on  "the distribution of this finding, this is unlikely to represent artifact.      3. Mismatched volume possibly representing ischemic brain/penumbra= 31 mL      4.  Please note that this cerebral perfusion study and report is Quantitative and targets supratentorial (cerebral) perfusion for evaluation of large vessel territory acute ischemia/infarction. For example, lacunar infarcts, and brainstem/posterior fossa    ischemia/infarction are not evaluated on this study.  Data acquisition is subject to artifacts which can yield non-anatomically plausible perfusion maps which may be due to motion, bolus timing, signal to noise ratio, or other technical factors.    Perfusion map abnormalities which show non-anatomic distributions are likely artifact.   This study is not \"stand-alone\" and should only be utilized for diagnosis, management/treatment in correlation with CT, CTA, and/or MRI and clinical factors.         CT-HEAD W/O   Final Result         1.  No acute intracranial abnormality.               EC-ECHOCARDIOGRAM COMPLETE W/O CONT    (Results Pending)       Patient Active Problem List   Diagnosis    Acute ischemic stroke (HCC)    Migraines    Hypokalemia    Lymphocytosis       Assessment and Plan:  * Acute ischemic stroke (HCC)- (present on admission)  Assessment & Plan  Patient presenting with left-sided weakness, facial droop, and numbness concerning for acute stroke.  CTA revealing proximal occlusion of M3.  Tenecteplase given 0539 with resolution in stroke symptoms.  Mechanical thrombectomy considered by IR but not performed due to TICI IIc flow.  Presentation concerning for onset of stroke in a healthy appearing individual of his age, prompting workup for hypercoagulability per neurology.  -Blood pressure target range 100-140 with nicardipine drip and as needed hydralazine  -Neurology consult, appreciate recs  -neurochecks/NIHSS per post-TNK/post-thrombectomy protocol  -Brain MRI without contrast  -Repeat head CT " only if deterioration  -Hemoglobin A1c  -Atorvastatin 40 daily, goal LDL less than 70  -TTE with bubble study  -Zio patch monitoring at discharge  -Hypercoagulable panel sent  -PT/OT/SLP okay within first 24 hours    Lymphocytosis  Assessment & Plan  Patient presents with largely reassuring laboratory evaluation.  However, he does have elevated lymphocytes (57%, 5.22 absolute number) and smudge cells present on smear.  He does report night sweats but denies weight loss.  No lymphadenopathy present on physical exam.  -Continue further lymphoma/leukemia workup with flow cytometry, etc.    Hypokalemia  Assessment & Plan  Electrolyte repletion per pharmacy protocol    Migraines  Assessment & Plan  Patient describes history of migraines with bitemporal distribution.  He reports some mostly in the mornings upon standing up quickly and notes that they resolved with Tylenol.  He takes 3-4 Tylenol per week.  -As needed acetaminophen while here      Juan Morales MD  PGY-1, [facility]    [facility]  Patient is critically ill.   The patient continues to have: Risk for brain bleed/recurrence of stroke symptoms  The vital organ system that is affected is the: Brain  If untreated there is a high chance of deterioration into: Stroke  And eventually death.   The critical care that I am providing today is: Blood pressure management and stroke prevention  The critical that has been undertaken is medically complex.   There has been no overlap in critical care time.   Critical Care Time not including procedures: 60 minutes

## 2025-02-14 NOTE — OR SURGEON
Immediate Post- Operative Note        Findings: Right M3 thrombus      Procedure(s): diagnostic angiogram    TICI 2c-TNK    No Thrombectomy is needed.      Estimated Blood Loss: Less than 5 ml        Complications: None            2/14/2025     7:06 AM     Reinaldo Reyes M.D.

## 2025-02-14 NOTE — ED NOTES
Medication history reviewed with patient.  Med rec is complete  Allergies reviewed.     Patient denies any outpatient antibiotics in the last 30 days.     Anticoagulants taken in the last 14 days? No       Tamara Swan PhT

## 2025-02-14 NOTE — PROGRESS NOTES
Cerebral angiogram with mechanical thrombectomy for M3 occlusion by Dr. gonzalez.  Emergent consent. Pre-procedure pedal pulses 1+. Right femoral artery access site. Pt placed on monitor, prepped and draped in a sterile fashion. Vital signs were taken every 5 minutes and remained within parameters (see doc flow sheets). 1st angio M3 found to be clear, no intervention needed. Hemostasis achieved using Angioseal deployed at 0702. Report given to PACU RN Kyra. Pt was transported to ICU with RN and monitor to r102. Stroke worksheet review during warm handoff with PACU RN . ICU responsible for serial checks starting at 0717. See Stroke Procedure flowsheet for VS, neuro, access, extremity serial assessments.    LKW:0000  NIH:2  TNK: 16 mg at 0539; stopped at 0540  Time in IR:0635  Access:Right femoral artery  1st angio:0655 M3 clear post TNK  Closure (end time):0702    Post procedure pedal pulses 1+    Angioseal VIP right femoral artery 8F  REF:[id number]  LOT:[id number]  EXP:09/6/24

## 2025-02-14 NOTE — DISCHARGE PLANNING
Case Management Discharge Planning    Admission Date: 2/14/2025  GMLOS: 2.2  ALOS: 0    6-Clicks ADL Score:    6-Clicks Mobility Score:      Anticipated Discharge Dispo: Discharge Disposition: Discharged to home/self care (01)    DME Needed: No    Action(s) Taken:   Chart review completed. Patient was discussed during IDT rounds.     Discharge assessment was complete (see below).     Escalations Completed: None    Medically Clear: No    Next Steps:  CM RN to follow up with medical team to discuss discharge barriers or needs.     Barriers to Discharge: Medical clearance    Care Transition Team Assessment    CM RN met with pt at the bedside to complete discharge assessment. CM RN Introduced self and department roles. Pt was A/Ox4 and agreeable to assessment.     Case management role discussed; understanding of case management role verbalized.   Demographics on face sheet verified.   Please see H&P for pertinent PMH and reason for admission.   Housing: Pt lived in a single story house with 2 steps to the entrance at [address] [place] [id number]  IADLS/ADLS: Independent with all ADL/IADLS and was an active  PTA.   DME: None owned or used PTA.   O2: RA at baseline.   Patient's PCP is: Not previously established.   Patient's preferred pharmacy is: [facility].   Insurance: Pt unsure of insurance coverage at this time; PFA to screen for updated insurance information or screen for financial assistance.  AD paperwork: None on file; packet declined.   Mental Health Concerns: None indicated.   Substance Abuse: None indicated.   Prior services: None indicated.   Discharge planning: Home with help.     Information Source  Orientation Level: Oriented X4  Information Given By: Patient  Who is responsible for making decisions for patient? : Patient    Readmission Evaluation  Is this a readmission?: No    Elopement Risk  Legal Hold: No  Ambulatory or Self Mobile in Wheelchair: No-Not an Elopement Risk    Discharge  Preparedness  What is your plan after discharge?: Home with help  What are your discharge supports?: Partner, Parent  Prior Functional Level: Ambulatory, Drives Self, Independent with Activities of Daily Living, Independent with Medication Management  Difficulity with ADLs: None  Difficulity with IADLs: None    Functional Assesment  Prior Functional Level: Ambulatory, Drives Self, Independent with Activities of Daily Living, Independent with Medication Management    Finances  Financial Barriers to Discharge: No  Prescription Coverage: Yes    Advance Directive  Advance Directive?: None    Domestic Abuse  Have you ever been the victim of abuse or violence?: No    Psychological Assessment  History of Substance Abuse: None  History of Psychiatric Problems: No    Discharge Risks or Barriers  Patient risk factors: Complex medical needs    Anticipated Discharge Information  Discharge Disposition: Discharged to home/self care (01)

## 2025-02-14 NOTE — THERAPY
Speech Language Pathology   Clinical Swallow Evaluation     Patient Name: Daniel Hastings  AGE:  24 y.o., SEX:  male  Medical Record #: [id number]  Date of Service: 2/14/2025      History of Present Illness  Patient is a 24 y.o. male who presented on 2/14 with left sided weakness and confusion. Found to have a distal right M2 occlusion and perfusion defect. Pt s/p tenecteplase. Pt sent to IR, angiography revealed lysis of his thrombus with TICI-2b flow and thrombectomy was not performed.    No notable past medical history.    CXR:  1.  No acute cardiopulmonary disease.     CT-Head:  1.  No acute intracranial abnormality.    MRI pending      General Information:  Vitals  O2 Delivery Device: None - Room Air  Level of Consciousness: Awake, Alert  Orientation: Oriented x 4  Follows Directives: Yes      Prior Living Situation & Level of Function:  Communication: WFL  Swallowing: WFL       Oral Mechanism Evaluation:  Dentition: Natural dentition   Facial Symmetry: Equal  Facial Sensation: Equal     Labial Observations: WFL   Lingual Observations: Midline          Laryngeal Function:  Secretion Management: Adequate  Voice Quality: WFL  Cough: Perceptually WNL       Subjective  Patient asleep upon arrival, easily roused to verbal cues. Denied any history of dysphagia.        Assessment  Current Method of Nutrition: NPO until cleared by speech pathology  Bolus Administration: Patient   O2 Delivery Device: None - Room Air  Factor(s) Affecting Performance: None       Swallowing Trials:  Swallowing Trials  Thin Liquid (TN0): WFL  Pureed (PU4): WFL  Regular (RG7): WFL      Comments: Patient adequately self-feeding with appropriate rate and volume of intake. Demo'd adequate oral bolus acceptance and containment. Complete AP transfer without notable oral bolus residue upon oral inspection. Functional mastication of solids. No cough/throat clear appreciated with PO. Vocal quality remained stable throughout PO intake. Single swallow  completed per bolus. No signs of esophageal dysfunction. Provided education regarding general aspiration precautions as well as signs of aspiration.        Clinical Impressions  Patient presents with a functional swallow. No overt s/sx of aspiration. Recommend oral diet of regular solids/thin liquids. Further acute speech therapy intervention for dysphagia is not warranted; please re-consult with any changes.       Recommendations  Diet Consistency: Regular solids/thin liquids  Instrumentation: None indicated at this time  Medication: As tolerated  Supervision: Independent  Positioning: Fully upright and midline during oral intake  Risk Management : Slow rate of intake  Oral Care: BID         SLP Treatment Plan  Treatment Plan: None Indicated  SLP Frequency: N/A - Evaluation Only  Estimated Duration: N/A - Evaluation Only      Anticipated Discharge Needs  Discharge Recommendations: Anticipate that the patient will have no further speech therapy needs after discharge from the hospital (pending cognitive evaluation)              Kirit Ivey, LISHA

## 2025-02-15 ENCOUNTER — APPOINTMENT (OUTPATIENT)
Dept: RADIOLOGY | Facility: MEDICAL CENTER | Age: 25
DRG: 062 | End: 2025-02-15
Attending: RADIOLOGY
Payer: COMMERCIAL

## 2025-02-15 ENCOUNTER — APPOINTMENT (OUTPATIENT)
Dept: RADIOLOGY | Facility: MEDICAL CENTER | Age: 25
DRG: 062 | End: 2025-02-15
Attending: NURSE PRACTITIONER
Payer: COMMERCIAL

## 2025-02-15 ENCOUNTER — HOSPITAL ENCOUNTER (OUTPATIENT)
Dept: RADIOLOGY | Facility: MEDICAL CENTER | Age: 25
End: 2025-02-15
Attending: INTERNAL MEDICINE
Payer: COMMERCIAL

## 2025-02-15 PROBLEM — I63.9 ACUTE CVA (CEREBROVASCULAR ACCIDENT) (HCC): Status: ACTIVE | Noted: 2025-02-15

## 2025-02-15 LAB
ANION GAP SERPL CALC-SCNC: 12 MMOL/L (ref 7–16)
BASOPHILS # BLD AUTO: 0.3 % (ref 0–1.8)
BASOPHILS # BLD: 0.03 K/UL (ref 0–0.12)
BUN SERPL-MCNC: 10 MG/DL (ref 8–22)
CALCIUM SERPL-MCNC: 9.4 MG/DL (ref 8.5–10.5)
CHLORIDE SERPL-SCNC: 103 MMOL/L (ref 96–112)
CO2 SERPL-SCNC: 20 MMOL/L (ref 20–33)
CREAT SERPL-MCNC: 0.9 MG/DL (ref 0.5–1.4)
EKG IMPRESSION: NORMAL
EOSINOPHIL # BLD AUTO: 0.02 K/UL (ref 0–0.51)
EOSINOPHIL NFR BLD: 0.2 % (ref 0–6.9)
ERYTHROCYTE [DISTWIDTH] IN BLOOD BY AUTOMATED COUNT: 40.7 FL (ref 35.9–50)
GFR SERPLBLD CREATININE-BSD FMLA CKD-EPI: 121 ML/MIN/1.73 M 2
GLUCOSE SERPL-MCNC: 105 MG/DL (ref 65–99)
HCT VFR BLD AUTO: 43.9 % (ref 42–52)
HGB BLD-MCNC: 15.4 G/DL (ref 14–18)
IMM GRANULOCYTES # BLD AUTO: 0.03 K/UL (ref 0–0.11)
IMM GRANULOCYTES NFR BLD AUTO: 0.3 % (ref 0–0.9)
LYMPHOCYTES # BLD AUTO: 2.13 K/UL (ref 1–4.8)
LYMPHOCYTES NFR BLD: 21.3 % (ref 22–41)
MAGNESIUM SERPL-MCNC: 1.8 MG/DL (ref 1.5–2.5)
MCH RBC QN AUTO: 31.2 PG (ref 27–33)
MCHC RBC AUTO-ENTMCNC: 35.1 G/DL (ref 32.3–36.5)
MCV RBC AUTO: 89 FL (ref 81.4–97.8)
MONOCYTES # BLD AUTO: 0.92 K/UL (ref 0–0.85)
MONOCYTES NFR BLD AUTO: 9.2 % (ref 0–13.4)
NEUTROPHILS # BLD AUTO: 6.88 K/UL (ref 1.82–7.42)
NEUTROPHILS NFR BLD: 68.7 % (ref 44–72)
NRBC # BLD AUTO: 0 K/UL
NRBC BLD-RTO: 0 /100 WBC (ref 0–0.2)
PHOSPHATE SERPL-MCNC: 3.5 MG/DL (ref 2.5–4.5)
PLATELET # BLD AUTO: 250 K/UL (ref 164–446)
PMV BLD AUTO: 10 FL (ref 9–12.9)
POTASSIUM SERPL-SCNC: 4.6 MMOL/L (ref 3.6–5.5)
RBC # BLD AUTO: 4.93 M/UL (ref 4.7–6.1)
SODIUM SERPL-SCNC: 135 MMOL/L (ref 135–145)
WBC # BLD AUTO: 10 K/UL (ref 4.8–10.8)

## 2025-02-15 PROCEDURE — 92523 SPEECH SOUND LANG COMPREHEN: CPT

## 2025-02-15 PROCEDURE — 70551 MRI BRAIN STEM W/O DYE: CPT

## 2025-02-15 PROCEDURE — 99223 1ST HOSP IP/OBS HIGH 75: CPT | Performed by: INTERNAL MEDICINE

## 2025-02-15 PROCEDURE — A9270 NON-COVERED ITEM OR SERVICE: HCPCS | Performed by: PSYCHIATRY & NEUROLOGY

## 2025-02-15 PROCEDURE — 700102 HCHG RX REV CODE 250 W/ 637 OVERRIDE(OP): Performed by: INTERNAL MEDICINE

## 2025-02-15 PROCEDURE — 84100 ASSAY OF PHOSPHORUS: CPT

## 2025-02-15 PROCEDURE — A9270 NON-COVERED ITEM OR SERVICE: HCPCS | Performed by: INTERNAL MEDICINE

## 2025-02-15 PROCEDURE — 93010 ELECTROCARDIOGRAM REPORT: CPT | Performed by: INTERNAL MEDICINE

## 2025-02-15 PROCEDURE — 700102 HCHG RX REV CODE 250 W/ 637 OVERRIDE(OP): Performed by: PSYCHIATRY & NEUROLOGY

## 2025-02-15 PROCEDURE — 99233 SBSQ HOSP IP/OBS HIGH 50: CPT | Performed by: PSYCHIATRY & NEUROLOGY

## 2025-02-15 PROCEDURE — 93926 LOWER EXTREMITY STUDY: CPT | Mod: RT

## 2025-02-15 PROCEDURE — 83735 ASSAY OF MAGNESIUM: CPT

## 2025-02-15 PROCEDURE — 80048 BASIC METABOLIC PNL TOTAL CA: CPT

## 2025-02-15 PROCEDURE — 85025 COMPLETE CBC W/AUTO DIFF WBC: CPT

## 2025-02-15 PROCEDURE — 70450 CT HEAD/BRAIN W/O DYE: CPT

## 2025-02-15 PROCEDURE — 700111 HCHG RX REV CODE 636 W/ 250 OVERRIDE (IP): Mod: JZ | Performed by: INTERNAL MEDICINE

## 2025-02-15 PROCEDURE — 770006 HCHG ROOM/CARE - MED/SURG/GYN SEMI*

## 2025-02-15 PROCEDURE — 99233 SBSQ HOSP IP/OBS HIGH 50: CPT | Mod: GC | Performed by: INTERNAL MEDICINE

## 2025-02-15 RX ORDER — ASPIRIN 81 MG/1
81 TABLET, CHEWABLE ORAL DAILY
Status: DISCONTINUED | OUTPATIENT
Start: 2025-02-15 | End: 2025-02-18

## 2025-02-15 RX ORDER — ENOXAPARIN SODIUM 100 MG/ML
40 INJECTION SUBCUTANEOUS DAILY
Status: DISCONTINUED | OUTPATIENT
Start: 2025-02-15 | End: 2025-02-18

## 2025-02-15 RX ADMIN — ATORVASTATIN CALCIUM 80 MG: 80 TABLET, FILM COATED ORAL at 17:17

## 2025-02-15 RX ADMIN — ENOXAPARIN SODIUM 40 MG: 100 INJECTION SUBCUTANEOUS at 17:16

## 2025-02-15 RX ADMIN — ASPIRIN 81 MG: 81 TABLET, CHEWABLE ORAL at 08:13

## 2025-02-15 ASSESSMENT — ENCOUNTER SYMPTOMS
HEADACHES: 0
SPEECH CHANGE: 0
TINGLING: 0
FOCAL WEAKNESS: 0
ORTHOPNEA: 0
CHILLS: 0
PHOTOPHOBIA: 0
SEIZURES: 0
SHORTNESS OF BREATH: 0
FEVER: 0
LOSS OF CONSCIOUSNESS: 0
WEIGHT LOSS: 0
DIARRHEA: 0
SPUTUM PRODUCTION: 0
DOUBLE VISION: 0
BLURRED VISION: 0
NECK PAIN: 0
HALLUCINATIONS: 0
TREMORS: 0
ABDOMINAL PAIN: 0
DIZZINESS: 0
PALPITATIONS: 0
VOMITING: 0
NAUSEA: 0
COUGH: 0
WEAKNESS: 0
CONSTIPATION: 0
BACK PAIN: 0
SENSORY CHANGE: 0
MYALGIAS: 0
EYE PAIN: 0

## 2025-02-15 ASSESSMENT — COGNITIVE AND FUNCTIONAL STATUS - GENERAL
MOBILITY SCORE: 23
SUGGESTED CMS G CODE MODIFIER DAILY ACTIVITY: CH
DAILY ACTIVITIY SCORE: 24
SUGGESTED CMS G CODE MODIFIER MOBILITY: CI
CLIMB 3 TO 5 STEPS WITH RAILING: A LITTLE

## 2025-02-15 ASSESSMENT — PAIN DESCRIPTION - PAIN TYPE
TYPE: ACUTE PAIN

## 2025-02-15 ASSESSMENT — FIBROSIS 4 INDEX: FIB4 SCORE: .65

## 2025-02-15 ASSESSMENT — LIFESTYLE VARIABLES: SUBSTANCE_ABUSE: 0

## 2025-02-15 NOTE — CONSULTS
Hospital Medicine Consultation    Date of Service  2/15/2025    Referring Physician    Eliezer Higgins D.O.     Consulting Physician  Anthony Willoughby M.D.    Reason for Consultation  Transfer of care    History of Presenting Illness  24 y.o. male with past ministry of migraines who presented to the hospital on 2/14/2025 with left-sided weakness and mild confusion.  Patient given emergent CT scan that revealed a distal right M2 occlusion and perfusion defect.  Mr. Hastings received TNKase and went to IR for emergent thrombectomy.  Angiography revealed lysis of his thrombus with TICI 2B flow and thrombectomy was not performed.  He admitted to ICU for post TNKase administration and close monitoring.    On February 15, 2025 intensivist Dr. Higgins requested to transfer care to hospitalist service.    I evaluated and examined Mr. Hastings in ICU.  Patient's mother at the bedside.  I obtained permission to discuss plan of care with mother.  He reported that overall he is feeling better.  He had bleeding at the access site for angiogram for IR but now it has stopped.  He denies any focal neurological deficit.  I had a lengthy discussion with patient and his mother regarding plan of care.  I discussed plan of care with bedside RN in ICU.    I reviewed finding of MRI that showed 14 mm focus of acute cerebral infarction in the right posterior basal ganglia.  No hemorrhagic transformation.  5 mm lacunar sized focus of acute infarction at the right anterior frontal convexity.  No hemorrhagic transformation.        Review of Systems  Review of Systems   Constitutional:  Negative for chills, fever and weight loss.   HENT:  Negative for hearing loss and tinnitus.    Eyes:  Negative for blurred vision, double vision, photophobia and pain.   Respiratory:  Negative for cough, sputum production and shortness of breath.    Cardiovascular:  Negative for chest pain, palpitations, orthopnea and leg swelling.   Gastrointestinal:  Negative  for abdominal pain, constipation, diarrhea, nausea and vomiting.   Genitourinary:  Negative for dysuria, frequency and urgency.   Musculoskeletal:  Negative for back pain, joint pain, myalgias and neck pain.   Skin:  Negative for rash.   Neurological:  Negative for dizziness, tingling, tremors, sensory change, speech change, focal weakness and headaches.   Psychiatric/Behavioral:  Negative for hallucinations and substance abuse.    All other systems reviewed and are negative.      Past Medical History   has no past medical history on file.    Surgical History   has no past surgical history on file.    Family History  family history is not on file.    Social History   reports that he has never smoked. He has never used smokeless tobacco. He reports current drug use. Drugs: Marijuana and Inhaled. He reports that he does not drink alcohol.    Medications  Prior to Admission Medications   Prescriptions Last Dose Informant Patient Reported? Taking?   acetaminophen (TYLENOL) 325 MG Tab 2/11/2025  Yes Yes   Sig: Take 650 mg by mouth every four hours as needed.      Facility-Administered Medications: None       Allergies  No Known Allergies    Physical Exam  Temp:  [36.3 °C (97.3 °F)-37.5 °C (99.5 °F)] 37 °C (98.6 °F)  Pulse:  [58-95] 73  Resp:  [12-25] 16  BP: (110-140)/(58-86) 122/78  SpO2:  [72 %-97 %] 97 %    Physical Exam  Vitals reviewed.   Constitutional:       General: He is not in acute distress.  HENT:      Head: Normocephalic and atraumatic. No contusion.      Right Ear: External ear normal.      Left Ear: External ear normal.      Nose: Nose normal.      Mouth/Throat:      Pharynx: No oropharyngeal exudate.   Eyes:      General:         Right eye: No discharge.         Left eye: No discharge.      Pupils: Pupils are equal, round, and reactive to light.   Cardiovascular:      Rate and Rhythm: Normal rate and regular rhythm.      Heart sounds: No murmur heard.     No friction rub. No gallop.   Pulmonary:       Effort: Pulmonary effort is normal.      Breath sounds: No wheezing or rhonchi.   Abdominal:      General: Bowel sounds are normal. There is no distension.      Palpations: Abdomen is soft.      Tenderness: There is no abdominal tenderness. There is no rebound.   Musculoskeletal:         General: No swelling or tenderness.      Cervical back: No rigidity. No muscular tenderness.      Comments: IR access site dressing is intact with no signs of active bleeding.   Skin:     General: Skin is warm and dry.      Coloration: Skin is not jaundiced.   Neurological:      General: No focal deficit present.      Mental Status: He is alert and oriented to person, place, and time.      Cranial Nerves: No cranial nerve deficit.      Sensory: No sensory deficit.      Comments: He is following commands and moving extremities.  Motor 5/5 in all 4 extremities   Psychiatric:         Mood and Affect: Mood normal.         Fluids  Date 02/15/25 0700 - 02/16/25 0659   Shift 8882-3683 4969-3044 5877-9289 24 Hour Total   INTAKE   Shift Total       OUTPUT   Urine 650   650   Shift Total 650   650   Weight (kg) 59.1 59.1 59.1 59.1       Laboratory  Recent Labs     02/14/25  0516 02/15/25  0300   WBC 9.1 10.0   RBC 5.22 4.93   HEMOGLOBIN 16.3 15.4   HEMATOCRIT 47.7 43.9   MCV 91.4 89.0   MCH 31.2 31.2   MCHC 34.2 35.1   RDW 40.7 40.7   PLATELETCT 246 250   MPV 9.8 10.0     Recent Labs     02/14/25  0516 02/15/25  0300   SODIUM 140 135   POTASSIUM 3.6 4.6   CHLORIDE 103 103   CO2 25 20   GLUCOSE 140* 105*   BUN 11 10   CREATININE 1.04 0.90   CALCIUM 9.5 9.4     Recent Labs     02/14/25  0516   APTT 24.0*   INR 1.11          Recent Labs     02/14/25  0516   TRIGLYCERIDE 81   HDL 46   LDL 63        Imaging  MR-BRAIN-W/O   Final Result      1.  14 mm focus of acute cerebral infarction in the right posterior basal ganglia. No hemorrhagic transformation.   2.  5 mm lacunar size focus of acute infarction at the right anterior frontal convexity. No  hemorrhagic transformation. Right MCA sylvian branch territory.   3.  Remainder of the study is within normal limits.      CT-HEAD W/O   Final Result      No acute intracranial abnormality.               EC-ECHOCARDIOGRAM COMPLETE W/O CONT   Final Result      IR-THROMBO MECHANICAL ARTERY,INIT   Final Result      1.  Diagnostic angiogram demonstrates occlusion of few right frontal and right parietal M4 branches indicating recanalization of previously seen M3 occlusion likely from the intravenous TNK. The mechanical thrombectomy is not indicated.   2.  TICI 2c      DX-CHEST-PORTABLE (1 VIEW)   Final Result         1.  No acute cardiopulmonary disease.      CT-CTA NECK WITH & W/O-POST PROCESSING   Final Result         1.  CT angiogram of the neck within normal limits.         CT-CTA HEAD WITH & W/O-POST PROCESS   Final Result         1.  Segmental area of proximal right M3 narrowing suggesting component of occlusion or vasospasm      These findings were discussed with the patient's clinician, Juan Youssef, on 2/14/2025 5:38 AM.      CT-CEREBRAL PERFUSION ANALYSIS   Final Result         1. Cerebral blood flow less than 30% possibly representing completed infarct = 0 mL. Based on distribution of this finding, this is unlikely to represent artifact.      2. T Max more than 6 seconds possibly representing combination of completed infarct and ischemia = 31 mL. Based on the distribution of this finding, this is unlikely to represent artifact.      3. Mismatched volume possibly representing ischemic brain/penumbra= 31 mL      4.  Please note that this cerebral perfusion study and report is Quantitative and targets supratentorial (cerebral) perfusion for evaluation of large vessel territory acute ischemia/infarction. For example, lacunar infarcts, and brainstem/posterior fossa    ischemia/infarction are not evaluated on this study.  Data acquisition is subject to artifacts which can yield non-anatomically plausible  "perfusion maps which may be due to motion, bolus timing, signal to noise ratio, or other technical factors.    Perfusion map abnormalities which show non-anatomic distributions are likely artifact.   This study is not \"stand-alone\" and should only be utilized for diagnosis, management/treatment in correlation with CT, CTA, and/or MRI and clinical factors.         CT-HEAD W/O   Final Result         1.  No acute intracranial abnormality.               EC-LATRICIA W/ CONT    (Results Pending)   US-EXTREMITY ARTERY LOWER UNILAT W/JOSH (COMBO) RIGHT    (Results Pending)       Assessment/Plan  * Acute ischemic stroke (HCC)- (present on admission)  Assessment & Plan  Presented with left sided weakness and mild confusion  CT imaging with distal right M2 thrombus  S/P tenecteplase at 0539 hrs 2/14  Emergent angiogram revealed TICI-2b flow after tenecteplase - no thrombectomy was necessary  NIH 0 currently   Echo normal EF and no evidence of interatrial shunt   -Pending MRI brain  -Neuro checks every 4h  -Strict blood pressure control with goal -140  Continue aspirin and statin  -Pending hypercoagulable state work up and LATRICIA.  Plan is to perform LATRICIA Monday.  N.p.o. from midnight on Sunday  -PT/OT per protocol  Now plan is to transfer him out from ICU to neuroscience floor  I discussed plan of care with patient and his mother at the bedside and answered all of their questions.  I personally discussed case with intensivist Dr. Higgins.    Lymphocytosis  Assessment & Plan  White blood cell count within normal limits    Hypokalemia  Assessment & Plan  Now resolved current potassium level is 4.6    Migraines  Assessment & Plan  History of  Currently controlled  Outpatient follow-up      Thank you for allow me to participate in patient care.  Hospitalist service will continue to follow this patient.    I reviewed and summarized patient hospitalization in this document.    I discussed plan of care with bedside RN in ICU.    I " discussed plan of care with intensivist Dr. Higgins    High complexity medical care due to multiorgan involvement.

## 2025-02-15 NOTE — PROGRESS NOTES
[facility] ICU Progress Note      Admit Date: 2/14/2025    Resident(s): Tejal Martinez M.D.   Attending:  Dr. Higgins    Patient ID:    Name:  Daniel Hastings     YOB: 2000  Age:  24 y.o.  male   MRN:  [id number]    Hospital Course (carried forward and updated):  Daniel Hastings is a 24 y.o. male with history of migraines. He presented to the ED this morning with left sided weakness and mild confusion. Emergent CT imaging revealed a distal right M2 occlusion and perfusion defect. He received tenecteplase and went to IR for emergent thrombectomy. Angiography revealed lysis of his thrombus with TICI-2b flow and thrombectomy was not performed.   (Reyez's HPI)    Consultants:  Critical Care  Neurology     Interval Events:    NEURO:   Aox4, facial and body symmetry, non focal. UDS cannabis, pending MRI  CARDIOVASC:  SR 50-80s, -130s  RESPIRATORY:  RA 95%   GI/NUTRITION:  Diet  RENAL/FLUID/LYTES: UO 2.2L SCR 0.90 K 4.6   HEME/ONC:   Hb 15, plt 250K   INFECTIOUS D:  WBC 10K   ENDOCRINE:       Vitals Range last 24h:  Temp:  [36.3 °C (97.3 °F)-37.4 °C (99.3 °F)] 36.9 °C (98.5 °F)  Pulse:  [58-94] 60  Resp:  [12-25] 15  BP: (110-132)/(58-94) 132/71  SpO2:  [72 %-97 %] 93 %      Intake/Output Summary (Last 24 hours) at 2/15/2025 1134  Last data filed at 2/15/2025 0600  Gross per 24 hour   Intake 2152.82 ml   Output 2250 ml   Net -97.18 ml        Review of Systems   Constitutional:  Negative for chills and fever.   Eyes:  Negative for blurred vision.   Respiratory:  Negative for shortness of breath.    Cardiovascular:  Negative for chest pain.   Gastrointestinal:  Negative for abdominal pain, diarrhea, nausea and vomiting.   Genitourinary:  Negative for dysuria.   Musculoskeletal:  Negative for myalgias.   Neurological:  Negative for sensory change, speech change, focal weakness, seizures, loss of consciousness and weakness.       PHYSICAL EXAM:  Vitals:    02/15/25 0300 02/15/25 0400 02/15/25 0509  02/15/25 0600   BP: 115/61 113/62 112/64 132/71   Pulse: 70 60 61 60   Resp: 12 14 12 15   Temp:  37.1 °C (98.7 °F)  36.9 °C (98.5 °F)   TempSrc:  Temporal  Temporal   SpO2: 96% 95% 96% 93%   Weight:       Height:        Body mass index is 17.96 kg/m².    O2 therapy: Pulse Oximetry: 93 %, O2 (LPM): 0, O2 Delivery Device: None - Room Air    Date 02/15/25 0700 - 02/16/25 0659   Shift 5505-0049 6931-3986 1284-8916 24 Hour Total   INTAKE   Shift Total       OUTPUT   Urine         Number of Times Voided 1 x   1 x   Shift Total       NET         Physical Exam  Vitals and nursing note reviewed.   Constitutional:       General: He is not in acute distress.     Appearance: He is not ill-appearing or toxic-appearing.   HENT:      Head: Normocephalic and atraumatic.      Mouth/Throat:      Mouth: Mucous membranes are moist.   Eyes:      Extraocular Movements: Extraocular movements intact.      Conjunctiva/sclera: Conjunctivae normal.      Pupils: Pupils are equal, round, and reactive to light.   Cardiovascular:      Rate and Rhythm: Normal rate and regular rhythm.      Pulses: Normal pulses.      Heart sounds: Normal heart sounds.   Pulmonary:      Effort: Pulmonary effort is normal.      Breath sounds: Normal breath sounds.   Abdominal:      General: Abdomen is flat. Bowel sounds are normal.      Palpations: Abdomen is soft.   Musculoskeletal:         General: No swelling. Normal range of motion.      Cervical back: No rigidity.      Comments: R groin bruise at site of access but no significant swelling or tenderness   Skin:     General: Skin is warm.      Capillary Refill: Capillary refill takes less than 2 seconds.   Neurological:      General: No focal deficit present.      Mental Status: He is alert and oriented to person, place, and time. Mental status is at baseline.      Cranial Nerves: No cranial nerve deficit.      Sensory: No sensory deficit.      Motor: No weakness.      Gait: Gait normal.      Deep Tendon  Reflexes: Reflexes normal.             Recent Labs     02/14/25  0516 02/15/25  0300   SODIUM 140 135   POTASSIUM 3.6 4.6   CHLORIDE 103 103   CO2 25 20   BUN 11 10   CREATININE 1.04 0.90   MAGNESIUM  --  1.8   PHOSPHORUS  --  3.5   CALCIUM 9.5 9.4     Recent Labs     02/14/25  0516 02/15/25  0300   ALTSGPT 16  --    ASTSGOT 26  --    ALKPHOSPHAT 72  --    TBILIRUBIN 0.5  --    GLUCOSE 140* 105*     Recent Labs     02/14/25  0516 02/15/25  0300   RBC 5.22 4.93   HEMOGLOBIN 16.3 15.4   HEMATOCRIT 47.7 43.9   PLATELETCT 246 250   PROTHROMBTM 14.4  --    APTT 24.0*  --    INR 1.11  --      Recent Labs     02/14/25  0516 02/15/25  0300   WBC 9.1 10.0   NEUTSPOLYS 31.30* 68.70   LYMPHOCYTES 57.40* 21.30*   MONOCYTES 7.80 9.20   EOSINOPHILS 2.60 0.20   BASOPHILS 0.90 0.30   ASTSGOT 26  --    ALTSGPT 16  --    ALKPHOSPHAT 72  --    TBILIRUBIN 0.5  --        Meds:   aspirin  81 mg      enoxaparin (LOVENOX) injection  40 mg      Respiratory Therapy Consult        acetaminophen  650 mg      ondansetron  4 mg      ondansetron  4 mg      atorvastatin  80 mg      MD Alert...Adult ICU Electrolyte Replacement per Pharmacy        HYDROmorphone  0.5-1 mg      niCARdipine (Cardene) Standard Infusion 0.1 mg/mL  0-15 mg/hr Stopped (02/14/25 0830)    hydrALAZINE  10-20 mg      labetalol  10-20 mg      LORazepam  0.5 mg          Procedures:  2/14 diagnostic angiogram - No Thrombectomy     Imaging:  CT-HEAD W/O   Final Result      No acute intracranial abnormality.               EC-ECHOCARDIOGRAM COMPLETE W/O CONT   Final Result      IR-THROMBO MECHANICAL ARTERY,INIT   Final Result      1.  Diagnostic angiogram demonstrates occlusion of few right frontal and right parietal M4 branches indicating recanalization of previously seen M3 occlusion likely from the intravenous TNK. The mechanical thrombectomy is not indicated.   2.  TICI 2c      DX-CHEST-PORTABLE (1 VIEW)   Final Result         1.  No acute cardiopulmonary disease.      CT-CTA  "NECK WITH & W/O-POST PROCESSING   Final Result         1.  CT angiogram of the neck within normal limits.         CT-CTA HEAD WITH & W/O-POST PROCESS   Final Result         1.  Segmental area of proximal right M3 narrowing suggesting component of occlusion or vasospasm      These findings were discussed with the patient's clinician, Juan Youssef, on 2/14/2025 5:38 AM.      CT-CEREBRAL PERFUSION ANALYSIS   Final Result         1. Cerebral blood flow less than 30% possibly representing completed infarct = 0 mL. Based on distribution of this finding, this is unlikely to represent artifact.      2. T Max more than 6 seconds possibly representing combination of completed infarct and ischemia = 31 mL. Based on the distribution of this finding, this is unlikely to represent artifact.      3. Mismatched volume possibly representing ischemic brain/penumbra= 31 mL      4.  Please note that this cerebral perfusion study and report is Quantitative and targets supratentorial (cerebral) perfusion for evaluation of large vessel territory acute ischemia/infarction. For example, lacunar infarcts, and brainstem/posterior fossa    ischemia/infarction are not evaluated on this study.  Data acquisition is subject to artifacts which can yield non-anatomically plausible perfusion maps which may be due to motion, bolus timing, signal to noise ratio, or other technical factors.    Perfusion map abnormalities which show non-anatomic distributions are likely artifact.   This study is not \"stand-alone\" and should only be utilized for diagnosis, management/treatment in correlation with CT, CTA, and/or MRI and clinical factors.         CT-HEAD W/O   Final Result         1.  No acute intracranial abnormality.               MR-BRAIN-W/O    (Results Pending)   EC-LATRICIA W/ CONT    (Results Pending)       ASSESSEMENT and PLAN:    * Acute ischemic stroke (HCC)- (present on admission)  Assessment & Plan  Presented with left sided weakness and mild " confusion  CT imaging with distal right M2 thrombus  S/P tenecteplase at 0539 hrs 2/14  Emergent angiogram revealed TICI-2b flow after tenecteplase - no thrombectomy was necessary  NIH 0 currently   Echo normal EF and no evidence of interatrial shunt   -Pending MRI brain  -Neuro checks every 4h  -Strict blood pressure control with goal -140  -Aspirin, statin  -Pending hypercoagulable state work up and LATRICIA  -PT/OT per protocol    Hypokalemia  Assessment & Plan  Repleted potassium  Encouraging PO  Goal >4    Migraines  Assessment & Plan  History of  Currently controlled      DISPO: Can be transferred to neuro floor     CODE STATUS: FULL     Quality Measures:  Feeding: DIet  Analgesia: Tylenol PRN  Sedation: N/A  Thromboprophylaxis: Hold until MRI back  Head of bed: >30 degrees  Ulcer prophylaxis: N/A  Glycemic control: N/A  Bowel care: bowel regimen  Indwelling lines: PIVs  Deescalation of antibiotics: N/A    Tejal MINER PGY-3  Internal Medicine [employer]

## 2025-02-15 NOTE — THERAPY
Speech Language Pathology   Cognitive Evaluation     Patient Name: Daniel Hastings  AGE:  24 y.o., SEX:  male  Medical Record #: [id number]  Date of Service: 2/15/2025      History of Present Illness    Patient is a 24 y.o. male who presented on 2/14 with left sided weakness and confusion. Found to have a distal right M2 occlusion and perfusion defect. Pt s/p tenecteplase. Pt sent to IR, angiography revealed lysis of his thrombus with TICI-2b flow and thrombectomy was not performed.    No notable past medical history.    CXR:  1.  No acute cardiopulmonary disease.     CT-Head:  1.  No acute intracranial abnormality.    MRI pending      General Information  Vitals  O2 (LPM): 0  O2 Delivery Device: None - Room Air  Level of Consciousness: Alert, Awake  Orientation: Oriented x 4  Follows Directives: Yes      Prior Living Situation & Level of Function  Housing / Facility: Mobile Home  Lives with - Patient's Self Care Capacity: Significant Other, Child Less than 18 Years of Age  Communication: WFL  Swallowing: WFL         Assessment  Pt seen on this date for a cognitive-linguistic evaluation. Portions of the Cognistat (The Neurobehavioral Cognitive Status Examination) were presented to the pt and they received the following scores:    Orientation: WNL  Attention: WNL  Repetition (Language):WNL  Memory:WNL  Calculations:Mild  Similarities (Reasoning):WNL  Judgment (Reasoning):WNL    Non-standardized measures were used to assess other cognitive domains and following written directions and confrontational naming were found to be WFL. Pt lives with his SO and her family. He is employed full time as a  at a [employer]. Pt endorsed feeling at his cognitive baseline and reported that he is not good at baseline.    Of note, cognitive-linguistic evaluations assess performance on various cognitive-linguistic domains such as expressive and receptive language, attention, memory, executive function, problem solving, etc. It is not  within the scope of Speech Language Pathologists to determine capacity, please defer to medical team or psych for capacity evaluation. Thank you.           Clinical Impressions  Pt is presenting with mild deficits in calculations otherwise other cognitive domains were WFL. No further acute SLP needs at this time. Please re-consult with any change in status or difficulty.       Recommendations  Supervision Needs Upons Discharge: None  IADLs: N/A         SLP Treatment Plan  Treatment Plan: None Indicated  SLP Frequency: N/A - Evaluation Only  Estimated Duration: N/A - Evaluation Only      Anticipated Discharge Needs  Discharge Recommendations: Anticipate that the patient will have no further speech therapy needs after discharge from the hospital  Therapy Recommendations Upon DC: Not Indicated                Mily Butts, SLP

## 2025-02-15 NOTE — PROGRESS NOTES
Radiology Progress Note   Author: GURDEEP Strickland Date & Time created: 2/14/2025  5:13 PM   Date of admission  2/14/2025  Note to reader: this note follows the APSO format rather than the historical SOAP format. Assessment and plan located at the top of the note for ease of use.    Chief Complaint  24 y.o. male admitted 2/14/2025 with   Chief Complaint   Patient presents with    Possible Stroke    Facial Droop    Weakness         HPI  24-year-old male with past medical history significant for migraines with aura presented 02/14/2024 with left sided weakness and slurred speech.  Last known well was approximately midnight on 02/14 when he went to bed.  Patient woke up early in the morning and tried to get up out of bed that fell due to left-sided weakness.  Patient denies hitting his head.  CTA/CTP shows proximal right M3 occlusion.  TNK was administered and patient was taken to the IR suite where Dr. Reyes performed a cerebral angiogram with finding of right M3 TICI 2C post TNK.  No thrombectomy was needed.     Patient examined at bedside.  Family in attendance.  Right groin access site soft, nontender, without ecchymosis, dressing CDI.  Neuro intact.  Patient states very mild tingling in left extremities when touched; this is improving.    Assessment/Plan     Principal Problem:    Acute ischemic stroke (HCC)  Active Problems:    Migraines    Hypokalemia    Lymphocytosis      Plan IR  - Post access site instructions: no lifting greater than 5 lbs and no baths/swimming/soaking in tub for 5 days. Shower OK. OK to change dressings/band aid as needed.   - Neuro checks per ICU protocol  - Secondary stroke prevention per Neurology recommendations  - Pending CT/MRI   - Pending PT/OT/ Swallow eval  - OLEKSANDR signing off   -  -Thank you for allowing Interventional Radiology team to participate in the patients care, if any additional care or requests are needed in the future please do not hesitate to call or  place IR order           Review of Systems  Physical Exam   Review of Systems   Constitutional:  Negative for chills and fever.   Respiratory:  Negative for shortness of breath.    Cardiovascular:  Negative for chest pain and palpitations.   Gastrointestinal:  Negative for nausea and vomiting.   Neurological:  Positive for tingling (Left extremities, mild, improving). Negative for sensory change, speech change, weakness and headaches.      Vitals:    02/14/25 1600   BP: 122/79   Pulse: 75   Resp: (!) 23   Temp: 37.4 °C (99.3 °F)   SpO2: 95%        Physical Exam  Cardiovascular:      Rate and Rhythm: Normal rate.      Pulses: Normal pulses.   Pulmonary:      Effort: Pulmonary effort is normal.   Abdominal:      Palpations: Abdomen is soft.   Skin:     General: Skin is warm and dry.   Neurological:      Mental Status: He is alert.      Comments: A/O x 4  PERRLA  Face symmetrical  Speech fluent  Shoulder shrug intact  Antigravity with no downward drift in all extremities  5/5 strength  Sensation intact    Psychiatric:         Mood and Affect: Mood normal.         Behavior: Behavior normal.             Labs    Recent Labs     02/14/25  0516   WBC 9.1   RBC 5.22   HEMOGLOBIN 16.3   HEMATOCRIT 47.7   MCV 91.4   MCH 31.2   MCHC 34.2   RDW 40.7   PLATELETCT 246   MPV 9.8     Recent Labs     02/14/25  0516   SODIUM 140   POTASSIUM 3.6   CHLORIDE 103   CO2 25   GLUCOSE 140*   BUN 11   CREATININE 1.04   CALCIUM 9.5     Recent Labs     02/14/25  0516   ALBUMIN 4.3   TBILIRUBIN 0.5   ALKPHOSPHAT 72   TOTPROTEIN 7.3   ALTSGPT 16   ASTSGOT 26   CREATININE 1.04     EC-ECHOCARDIOGRAM COMPLETE W/O CONT   Final Result      IR-THROMBO MECHANICAL ARTERY,INIT   Final Result      1.  Diagnostic angiogram demonstrates occlusion of few right frontal and right parietal M4 branches indicating recanalization of previously seen M3 occlusion likely from the intravenous TNK. The mechanical thrombectomy is not indicated.   2.  TICI 2c     "  DX-CHEST-PORTABLE (1 VIEW)   Final Result         1.  No acute cardiopulmonary disease.      CT-CTA NECK WITH & W/O-POST PROCESSING   Final Result         1.  CT angiogram of the neck within normal limits.         CT-CTA HEAD WITH & W/O-POST PROCESS   Final Result         1.  Segmental area of proximal right M3 narrowing suggesting component of occlusion or vasospasm      These findings were discussed with the patient's clinician, Juan Youssef, on 2/14/2025 5:38 AM.      CT-CEREBRAL PERFUSION ANALYSIS   Final Result         1. Cerebral blood flow less than 30% possibly representing completed infarct = 0 mL. Based on distribution of this finding, this is unlikely to represent artifact.      2. T Max more than 6 seconds possibly representing combination of completed infarct and ischemia = 31 mL. Based on the distribution of this finding, this is unlikely to represent artifact.      3. Mismatched volume possibly representing ischemic brain/penumbra= 31 mL      4.  Please note that this cerebral perfusion study and report is Quantitative and targets supratentorial (cerebral) perfusion for evaluation of large vessel territory acute ischemia/infarction. For example, lacunar infarcts, and brainstem/posterior fossa    ischemia/infarction are not evaluated on this study.  Data acquisition is subject to artifacts which can yield non-anatomically plausible perfusion maps which may be due to motion, bolus timing, signal to noise ratio, or other technical factors.    Perfusion map abnormalities which show non-anatomic distributions are likely artifact.   This study is not \"stand-alone\" and should only be utilized for diagnosis, management/treatment in correlation with CT, CTA, and/or MRI and clinical factors.         CT-HEAD W/O   Final Result         1.  No acute intracranial abnormality.                 INR   Date Value Ref Range Status   02/14/2025 1.11 0.87 - 1.13 Final     Comment:     INR - Non-therapeutic Reference " "Range: 0.87-1.13  INR - Therapeutic Reference Range: 2.0-4.0       No results found for: \"POCINR\"     Intake/Output Summary (Last 24 hours) at 2/14/2025 1713  Last data filed at 2/14/2025 1600  Gross per 24 hour   Intake 1272.93 ml   Output 1300 ml   Net -27.07 ml      I have personally reviewed the above labs and imaging      I have performed a physical exam and reviewed and updated ROS and Plan today (2/14/2025).       "

## 2025-02-15 NOTE — PROGRESS NOTES
4 Eyes Skin Assessment Completed by MICHELLE Fernando and MICHELLE Arreaga.    Head WDL  Ears WDL  Nose WDL  Mouth WDL  Neck WDL  Breast/Chest WDL  Shoulder Blades WDL  Spine WDL  (R) Arm/Elbow/Hand WDL  (L) Arm/Elbow/Hand WDL  Abdomen WDL  Groin Bruising, R groin site with dressing in place  Scrotum/Coccyx/Buttocks WDL  (R) Leg WDL  (L) Leg WDL  (R) Heel/Foot/Toe WDL  (L) Heel/Foot/Toe WDL          Devices In Places Blood Pressure Cuff and Pulse Ox      Interventions In Place Pillows and Barrier Cream    Possible Skin Injury No    Pictures Uploaded Into Epic N/A  Wound Consult Placed N/A  RN Wound Prevention Protocol Ordered No

## 2025-02-15 NOTE — CARE PLAN
The patient is Watcher - Medium risk of patient condition declining or worsening    Shift Goals  Clinical Goals: post TNK neuro checks, mobilize as able, monitor groin site  Patient Goals: rest  Family Goals:     Progress made toward(s) clinical / shift goals:      Problem: Pain - Standard  Goal: Alleviation of pain or a reduction in pain to the patient’s comfort goal  Outcome: Progressing     Problem: Optimal Care of the Stroke Patient  Goal: Optimal acute care for the stroke patient  Outcome: Progressing     Problem: Knowledge Deficit - Stroke Education  Goal: Patient's knowledge of stroke and risk factors will improve  Outcome: Progressing     Problem: Neuro Status  Goal: Neuro status will remain stable or improve  Outcome: Progressing     Problem: Hemodynamic Monitoring  Goal: Patient's hemodynamics, fluid balance and neurologic status will be stable or improve  Outcome: Progressing     Problem: Mobility - Stroke  Goal: Patient's capacity to carry out activities will improve  Outcome: Progressing       Patient is not progressing towards the following goals:

## 2025-02-15 NOTE — PROGRESS NOTES
"Late entry    0730 patient ambulated to the bathroom successfully, patient stated that he felt his right groin site \"pop\" as he turned to wipe himself. Patient had excruciating pain and was unable to walk back to his room. Groin site was firm and swollen, manual pressure applied for 15 minutes, no bleeding, and VS stable.    0747 Dr. Reyes notified, instructed this RN to keep monitoring the groin site and apply fem stop if hematoma worsens.   "

## 2025-02-15 NOTE — PROGRESS NOTES
Neurology Progress Note  Neurohospitalist Service, [facility]    Referring Physician: Eliezer Higgins D.O.    Chief Complaint   Patient presents with    Possible Stroke    Facial Droop    Weakness       HPI: Refer to initial documented Neurology H&P, as detailed in the patient's chart.    Interval History 2/15: No new events.  Patient feels back to his baseline.    Review of systems: In addition to what is detailed in the HPI and/or updated in the interval history, all other systems reviewed and are negative.    Past Medical History:    has no past medical history on file.    FHx:  family history is not on file.    SHx:   reports that he has never smoked. He has never used smokeless tobacco. He reports current drug use. Drugs: Marijuana and Inhaled. He reports that he does not drink alcohol.    Medications:    Current Facility-Administered Medications:     aspirin (Asa) chewable tab 81 mg, 81 mg, Oral, DAILY, Chandan Crook M.D., 81 mg at 02/15/25 0813    Respiratory Therapy Consult, , Nebulization, Continuous RT, Marcos Reyez M.D.    acetaminophen (Tylenol) tablet 650 mg, 650 mg, Oral, Q6HRS PRN, Marcos Reyez M.D., 650 mg at 02/14/25 1406    ondansetron (Zofran) syringe/vial injection 4 mg, 4 mg, Intravenous, Q4HRS PRN, Marcos Reyez M.D., 4 mg at 02/14/25 0732    ondansetron (Zofran ODT) dispertab 4 mg, 4 mg, Oral, Q4HRS PRN, Marcos Reyez M.D.    atorvastatin (Lipitor) tablet 80 mg, 80 mg, Oral, Q EVENING, Marcos Reyez M.D., 80 mg at 02/14/25 1719    MD Alert...ICU Electrolyte Replacement per Pharmacy, , Other, PHARMACY TO DOSE, Marcos Reyez M.D.    HYDROmorphone (Dilaudid) injection 0.5-1 mg, 0.5-1 mg, Intravenous, Q3HRS PRN, Marcos Reyez M.D., 1 mg at 02/14/25 1939    niCARdipine (Cardene) 25 mg in  mL Standard Infusion, 0-15 mg/hr, Intravenous, Continuous, Marcos Reyez M.D., Dose not Required at 02/14/25  0830    hydrALAZINE (Apresoline) injection 10-20 mg, 10-20 mg, Intravenous, Q4HRS PRN, Marcos Reyez M.D.    labetalol (Normodyne/Trandate) injection 10-20 mg, 10-20 mg, Intravenous, Q4HRS PRN, Marcos Reyez M.D.    LORazepam (Ativan) injection 0.5 mg, 0.5 mg, Intravenous, Q4HRS PRN, Marcos Reyez M.D., 0.5 mg at 02/14/25 0814    Physical Examination:     Vitals:    02/15/25 0300 02/15/25 0400 02/15/25 0500 02/15/25 0600   BP: 115/61 113/62 112/64 132/71   Pulse: 70 60 61 60   Resp: 12 14 12 15   Temp:  37.1 °C (98.7 °F)  36.9 °C (98.5 °F)   TempSrc:  Temporal  Temporal   SpO2: 96% 95% 96% 93%   Weight:       Height:               NEUROLOGICAL EXAM:     Patient is awake and alert fully oriented.  Speech is intact.  Able to repeat name objects.  Follows commands.  Pupils equal reactive.  Visual field intact.  She is extraocular muscles intact.  Face symmetrical.  Sensation intact.  Hearing intact.  Tongue midline.  Sustained antigravity strength in all 4.  Symmetrical.  Sensation intact.   Finger-to-nose testing was normal   Gait normal.    NIH 0        Objective Data:    Labs:  Lab Results   Component Value Date/Time    PROTHROMBTM 14.4 02/14/2025 05:16 AM    INR 1.11 02/14/2025 05:16 AM      Lab Results   Component Value Date/Time    WBC 10.0 02/15/2025 03:00 AM    RBC 4.93 02/15/2025 03:00 AM    HEMOGLOBIN 15.4 02/15/2025 03:00 AM    HEMATOCRIT 43.9 02/15/2025 03:00 AM    MCV 89.0 02/15/2025 03:00 AM    MCH 31.2 02/15/2025 03:00 AM    MCHC 35.1 02/15/2025 03:00 AM    MPV 10.0 02/15/2025 03:00 AM    NEUTSPOLYS 68.70 02/15/2025 03:00 AM    LYMPHOCYTES 21.30 (L) 02/15/2025 03:00 AM    MONOCYTES 9.20 02/15/2025 03:00 AM    EOSINOPHILS 0.20 02/15/2025 03:00 AM    BASOPHILS 0.30 02/15/2025 03:00 AM      Lab Results   Component Value Date/Time    SODIUM 135 02/15/2025 03:00 AM    POTASSIUM 4.6 02/15/2025 03:00 AM    CHLORIDE 103 02/15/2025 03:00 AM    CO2 20 02/15/2025 03:00 AM    GLUCOSE 105  (H) 02/15/2025 03:00 AM    BUN 10 02/15/2025 03:00 AM    CREATININE 0.90 02/15/2025 03:00 AM      Lab Results   Component Value Date/Time    CHOLSTRLTOT 125 02/14/2025 05:16 AM    LDL 63 02/14/2025 05:16 AM    HDL 46 02/14/2025 05:16 AM    TRIGLYCERIDE 81 02/14/2025 05:16 AM       Lab Results   Component Value Date/Time    ALKPHOSPHAT 72 02/14/2025 05:16 AM    ASTSGOT 26 02/14/2025 05:16 AM    ALTSGPT 16 02/14/2025 05:16 AM    TBILIRUBIN 0.5 02/14/2025 05:16 AM        Imaging/Testing:  CT-HEAD W/O   Final Result      No acute intracranial abnormality.               EC-ECHOCARDIOGRAM COMPLETE W/O CONT   Final Result      IR-THROMBO MECHANICAL ARTERY,INIT   Final Result      1.  Diagnostic angiogram demonstrates occlusion of few right frontal and right parietal M4 branches indicating recanalization of previously seen M3 occlusion likely from the intravenous TNK. The mechanical thrombectomy is not indicated.   2.  TICI 2c      DX-CHEST-PORTABLE (1 VIEW)   Final Result         1.  No acute cardiopulmonary disease.      CT-CTA NECK WITH & W/O-POST PROCESSING   Final Result         1.  CT angiogram of the neck within normal limits.         CT-CTA HEAD WITH & W/O-POST PROCESS   Final Result         1.  Segmental area of proximal right M3 narrowing suggesting component of occlusion or vasospasm      These findings were discussed with the patient's clinician, Juan Youssef, on 2/14/2025 5:38 AM.      CT-CEREBRAL PERFUSION ANALYSIS   Final Result         1. Cerebral blood flow less than 30% possibly representing completed infarct = 0 mL. Based on distribution of this finding, this is unlikely to represent artifact.      2. T Max more than 6 seconds possibly representing combination of completed infarct and ischemia = 31 mL. Based on the distribution of this finding, this is unlikely to represent artifact.      3. Mismatched volume possibly representing ischemic brain/penumbra= 31 mL      4.  Please note that this cerebral  "perfusion study and report is Quantitative and targets supratentorial (cerebral) perfusion for evaluation of large vessel territory acute ischemia/infarction. For example, lacunar infarcts, and brainstem/posterior fossa    ischemia/infarction are not evaluated on this study.  Data acquisition is subject to artifacts which can yield non-anatomically plausible perfusion maps which may be due to motion, bolus timing, signal to noise ratio, or other technical factors.    Perfusion map abnormalities which show non-anatomic distributions are likely artifact.   This study is not \"stand-alone\" and should only be utilized for diagnosis, management/treatment in correlation with CT, CTA, and/or MRI and clinical factors.         CT-HEAD W/O   Final Result         1.  No acute intracranial abnormality.               MR-BRAIN-W/O    (Results Pending)   MR-BRAIN-W/O    (Results Pending)   EC-LATRICIA W/ CONT    (Results Pending)         Assessment and Plan:    24-year-old male presenting with left facial droop mild confusion.  CTA revealed a right distal M2 thrombus.  Status post tPA.  Patient has returned back to his baseline.  Thrombectomy was not performed due to M3 thrombus which was not retrievable.  At this time etiology is unknown.  TTE with bubble was normal yesterday.  I would like to proceed with a LATRICIA given his relatively young age and unclear source at this time.  Hypercoagulable panel still pending.  Okay to initiate aspirin today.  Okay to downgrade out of the ICU with acute 4-hour neurochecks.  MRI brain still pending.    Plan:  1.  Every 4 hours neurochecks  2.  Aspirin 81 mg daily.  3.  MRI brain  4.  LATRICIA  5.  Hypercoagulable panel pending.  Factor V, factor II, Antithrombin III, APL  6.  PT/OT and speech therapy  7.  Maintain an LDL below 70.  Currently 63 without treatment  8.  Maintain normoglycemia        Spent over 56 minutes reviewing the case including CTA, CTP, examined the patient, labs since yesterday.  Going " over the care plan with the ICU team.      This chart was partially generated using voice recognition technology and sound alike word replacement may be present, best efforts were made to make the chart accurate.    Chandan Crook MD  Board Certified Neurology, ABPN  t) [phone]

## 2025-02-16 ENCOUNTER — APPOINTMENT (OUTPATIENT)
Dept: CARDIOLOGY | Facility: MEDICAL CENTER | Age: 25
DRG: 062 | End: 2025-02-16
Payer: COMMERCIAL

## 2025-02-16 LAB
ANION GAP SERPL CALC-SCNC: 14 MMOL/L (ref 7–16)
AT III ACT/NOR PPP CHRO: 110 % (ref 76–128)
B2 GLYCOPROT1 IGG SERPL IA-ACNC: <10 SGU
B2 GLYCOPROT1 IGM SERPL IA-ACNC: <10 SMU
BUN SERPL-MCNC: 12 MG/DL (ref 8–22)
CALCIUM SERPL-MCNC: 9.6 MG/DL (ref 8.5–10.5)
CARDIOLIPIN IGG SER IA-ACNC: <10 GPL
CARDIOLIPIN IGM SER IA-ACNC: <10 MPL
CHLORIDE SERPL-SCNC: 101 MMOL/L (ref 96–112)
CO2 SERPL-SCNC: 21 MMOL/L (ref 20–33)
CREAT SERPL-MCNC: 0.86 MG/DL (ref 0.5–1.4)
ERYTHROCYTE [DISTWIDTH] IN BLOOD BY AUTOMATED COUNT: 40.5 FL (ref 35.9–50)
GFR SERPLBLD CREATININE-BSD FMLA CKD-EPI: 123 ML/MIN/1.73 M 2
GLUCOSE SERPL-MCNC: 94 MG/DL (ref 65–99)
HCT VFR BLD AUTO: 46.8 % (ref 42–52)
HGB BLD-MCNC: 15.9 G/DL (ref 14–18)
MAGNESIUM SERPL-MCNC: 1.9 MG/DL (ref 1.5–2.5)
MCH RBC QN AUTO: 30.6 PG (ref 27–33)
MCHC RBC AUTO-ENTMCNC: 34 G/DL (ref 32.3–36.5)
MCV RBC AUTO: 90.2 FL (ref 81.4–97.8)
PHOSPHATE SERPL-MCNC: 3.6 MG/DL (ref 2.5–4.5)
PLATELET # BLD AUTO: 252 K/UL (ref 164–446)
PMV BLD AUTO: 9.9 FL (ref 9–12.9)
POTASSIUM SERPL-SCNC: 3.8 MMOL/L (ref 3.6–5.5)
PROT C ACT/NOR PPP: 88 % (ref 83–168)
PROT S ACT/NOR PPP: 91 % (ref 66–143)
RBC # BLD AUTO: 5.19 M/UL (ref 4.7–6.1)
SODIUM SERPL-SCNC: 136 MMOL/L (ref 135–145)
WBC # BLD AUTO: 12.6 K/UL (ref 4.8–10.8)

## 2025-02-16 PROCEDURE — 84100 ASSAY OF PHOSPHORUS: CPT

## 2025-02-16 PROCEDURE — 85027 COMPLETE CBC AUTOMATED: CPT

## 2025-02-16 PROCEDURE — 99232 SBSQ HOSP IP/OBS MODERATE 35: CPT | Performed by: PSYCHIATRY & NEUROLOGY

## 2025-02-16 PROCEDURE — 97165 OT EVAL LOW COMPLEX 30 MIN: CPT

## 2025-02-16 PROCEDURE — 700102 HCHG RX REV CODE 250 W/ 637 OVERRIDE(OP): Performed by: STUDENT IN AN ORGANIZED HEALTH CARE EDUCATION/TRAINING PROGRAM

## 2025-02-16 PROCEDURE — 97535 SELF CARE MNGMENT TRAINING: CPT

## 2025-02-16 PROCEDURE — A9270 NON-COVERED ITEM OR SERVICE: HCPCS | Performed by: INTERNAL MEDICINE

## 2025-02-16 PROCEDURE — A9270 NON-COVERED ITEM OR SERVICE: HCPCS | Performed by: STUDENT IN AN ORGANIZED HEALTH CARE EDUCATION/TRAINING PROGRAM

## 2025-02-16 PROCEDURE — 36415 COLL VENOUS BLD VENIPUNCTURE: CPT

## 2025-02-16 PROCEDURE — 700102 HCHG RX REV CODE 250 W/ 637 OVERRIDE(OP): Performed by: INTERNAL MEDICINE

## 2025-02-16 PROCEDURE — 700111 HCHG RX REV CODE 636 W/ 250 OVERRIDE (IP): Mod: JZ | Performed by: INTERNAL MEDICINE

## 2025-02-16 PROCEDURE — 700102 HCHG RX REV CODE 250 W/ 637 OVERRIDE(OP): Performed by: PSYCHIATRY & NEUROLOGY

## 2025-02-16 PROCEDURE — 99232 SBSQ HOSP IP/OBS MODERATE 35: CPT | Performed by: STUDENT IN AN ORGANIZED HEALTH CARE EDUCATION/TRAINING PROGRAM

## 2025-02-16 PROCEDURE — 83735 ASSAY OF MAGNESIUM: CPT

## 2025-02-16 PROCEDURE — 770006 HCHG ROOM/CARE - MED/SURG/GYN SEMI*

## 2025-02-16 PROCEDURE — 80048 BASIC METABOLIC PNL TOTAL CA: CPT

## 2025-02-16 PROCEDURE — A9270 NON-COVERED ITEM OR SERVICE: HCPCS | Performed by: PSYCHIATRY & NEUROLOGY

## 2025-02-16 RX ORDER — OXYCODONE HYDROCHLORIDE 5 MG/1
2.5-5 TABLET ORAL EVERY 6 HOURS PRN
Status: DISCONTINUED | OUTPATIENT
Start: 2025-02-16 | End: 2025-02-18 | Stop reason: HOSPADM

## 2025-02-16 RX ORDER — POLYETHYLENE GLYCOL 3350 17 G/17G
1 POWDER, FOR SOLUTION ORAL
Status: DISCONTINUED | OUTPATIENT
Start: 2025-02-16 | End: 2025-02-18 | Stop reason: HOSPADM

## 2025-02-16 RX ORDER — ATORVASTATIN CALCIUM 10 MG/1
10 TABLET, FILM COATED ORAL EVERY EVENING
Status: DISCONTINUED | OUTPATIENT
Start: 2025-02-16 | End: 2025-02-18

## 2025-02-16 RX ORDER — AMOXICILLIN 250 MG
2 CAPSULE ORAL EVERY EVENING
Status: DISCONTINUED | OUTPATIENT
Start: 2025-02-16 | End: 2025-02-18 | Stop reason: HOSPADM

## 2025-02-16 RX ADMIN — ACETAMINOPHEN 650 MG: 325 TABLET ORAL at 05:03

## 2025-02-16 RX ADMIN — ASPIRIN 81 MG: 81 TABLET, CHEWABLE ORAL at 05:03

## 2025-02-16 RX ADMIN — ATORVASTATIN CALCIUM 10 MG: 10 TABLET, FILM COATED ORAL at 18:44

## 2025-02-16 RX ADMIN — ENOXAPARIN SODIUM 40 MG: 100 INJECTION SUBCUTANEOUS at 18:45

## 2025-02-16 RX ADMIN — SENNOSIDES AND DOCUSATE SODIUM 2 TABLET: 50; 8.6 TABLET ORAL at 18:44

## 2025-02-16 RX ADMIN — OXYCODONE 5 MG: 5 TABLET ORAL at 10:26

## 2025-02-16 RX ADMIN — OXYCODONE 5 MG: 5 TABLET ORAL at 20:14

## 2025-02-16 ASSESSMENT — ENCOUNTER SYMPTOMS
ABDOMINAL PAIN: 0
SHORTNESS OF BREATH: 0
FOCAL WEAKNESS: 0
FEVER: 0
VOMITING: 0
DIZZINESS: 0
BACK PAIN: 1
NAUSEA: 0
SENSORY CHANGE: 0

## 2025-02-16 ASSESSMENT — COGNITIVE AND FUNCTIONAL STATUS - GENERAL
HELP NEEDED FOR BATHING: A LITTLE
SUGGESTED CMS G CODE MODIFIER DAILY ACTIVITY: CI
DAILY ACTIVITIY SCORE: 23

## 2025-02-16 ASSESSMENT — PAIN DESCRIPTION - PAIN TYPE
TYPE: ACUTE PAIN

## 2025-02-16 ASSESSMENT — ACTIVITIES OF DAILY LIVING (ADL): TOILETING: INDEPENDENT

## 2025-02-16 NOTE — PROGRESS NOTES
Brief Cardiology Note:    Cardiology was consulted for LATRICIA with indication of stroke in a 24 year old man with history of migraines, no other medical issues.     Echocardiogram dated 2/14/2025 shows normal echo without evidence of interatrial shunt by agitated saline bubble study.  ECG dated 2/15/2025 shows sinus rhythm, 59 bpm, ST elevation consistent with a early repolarization.    Brain MRI dated 2/14/2025:  1.  14 mm focus of acute cerebral infarction in the right posterior basal ganglia. No hemorrhagic transformation.  2.  5 mm lacunar size focus of acute infarction at the right anterior frontal convexity. No hemorrhagic transformation. Right MCA sylvian branch territory.  3.  Remainder of the study is within normal limits.    Neurology is requesting a transesophageal echo to assess for other possible etiology and reassess interatrial septum.  Hypercoagulable panel is pending.    Will plan for transesophageal echo on Monday, 2/16/2025 but as it is a holiday, might be 2/17/25.    Electronically Signed by:  Alma Mcmanus MD  2/15/2025  4:36 PM

## 2025-02-16 NOTE — HOSPITAL COURSE
This is a 24-year-old male with past medical history of migraines and prediabetes A1C 6.1 who was admitted on 2/14 with left-sided weakness.    CT imaging revealed distal right M2 occlusion.  Patient was administered TNKase and underwent emergent IR thrombectomy, \angiography revealed lysis of his thrombus with TICI 2B flow and thrombectomy was not performed.  He admitted to ICU for post TNKase administration and close monitoring.  He had some bleeding noted the Rt femoral access site, this was sandbagged. US does not show pseudoaneurysms or fistulization.     MRI completed and  showed 14 mm focus of acute cerebral infarction in the right posterior basal ganglia.  No hemorrhagic transformation.  5 mm lacunar sized focus of acute infarction at the right anterior frontal convexity.  No hemorrhagic transformation.    Neurology was consulted, TTE with bubble was normal. A1C 6.1. LDL 63.  Given unclear etiology of CVA, LATRICIA was recommended. Hypercoagulable panel thus far negative, APL pending.  Patient initiated on Eliquis.  Patient had LATRICIA performed which noted normal LVEF,  no presence of left atrial appendage thrombus however did note presence of PFO with bidirectional shunt.  Cardiology referral placed for outpatient structural heart team for evaluation of PFO closure with Dr. Gasca.

## 2025-02-16 NOTE — PROGRESS NOTES
Hospital Medicine Daily Progress Note    Date of Service  2/16/2025    Chief Complaint  Daniel Hastings is a 24 y.o. male admitted 2/14/2025 with left sided weakness, confusion    Hospital Course  24 y.o. male with past ministry of migraines who presented to the hospital on 2/14/2025 with left-sided weakness and mild confusion.  Patient given emergent CT scan that revealed a distal right M2 occlusion and perfusion defect.  Mr. Hastings received TNKase and went to IR for emergent thrombectomy.  Angiography revealed lysis of his thrombus with TICI 2B flow and thrombectomy was not performed.  He admitted to ICU for post TNKase administration and close monitoring.  He had some bleeding noted the Rt femoral access site, this was sandbagged. US does not show pseudoaneurysms or fistulization.   MRI completed and  showed 14 mm focus of acute cerebral infarction in the right posterior basal ganglia.  No hemorrhagic transformation.  5 mm lacunar sized focus of acute infarction at the right anterior frontal convexity.  No hemorrhagic transformation.  Neurology is following, he has been started on aspirin, given his age LATRICIA was recommended, his TTE was unremarkable. He has been transferred to the neuroscience floor.     Interval Problem Update  Pt seen and examined he is awake and alert, he is having some mild discomfort and swelling to Rt groin site, he does feel that that it is slightly more swollen. There is bruising and swelling but no significant tenderness, appears similar to demarcating broders drawn previously. US reviewed. Continue to monitor, supportive care  - potential plan for LATRICIA tomorrow or Tuesday. NPO at [facility] for possible procedure tomorrow   - neuro following, aspirin therapy, LDL <70 without statin   - PT/OT    I have discussed this patient's plan of care and discharge plan at IDT rounds today with Case Management, Nursing, Nursing leadership, and other members of the IDT  team.    Consultants/Specialty  cardiology, critical care, and neurology    Code Status  Full Code    Disposition  The patient is not medically cleared for discharge to home or a post-acute facility.      I have placed the appropriate orders for post-discharge needs.    Review of Systems  Review of Systems   Constitutional:  Negative for fever.   Respiratory:  Negative for shortness of breath.    Cardiovascular:  Positive for leg swelling. Negative for chest pain.   Gastrointestinal:  Negative for abdominal pain, nausea and vomiting.   Genitourinary:  Negative for dysuria and urgency.   Musculoskeletal:  Positive for back pain.   Neurological:  Negative for dizziness, sensory change and focal weakness.        Physical Exam  Temp:  [36.2 °C (97.2 °F)-37.1 °C (98.8 °F)] 36.2 °C (97.2 °F)  Pulse:  [57-74] 63  Resp:  [16-18] 18  BP: (113-140)/(68-87) 133/74  SpO2:  [95 %-97 %] 95 %    Physical Exam  Vitals and nursing note reviewed.   Constitutional:       Appearance: He is not ill-appearing.      Comments: Thin, young  male, no acute distress    HENT:      Head: Normocephalic and atraumatic.      Mouth/Throat:      Mouth: Mucous membranes are moist.      Pharynx: Oropharynx is clear.   Eyes:      Conjunctiva/sclera: Conjunctivae normal.      Comments: Wears corrective lenses    Cardiovascular:      Rate and Rhythm: Normal rate and regular rhythm.      Heart sounds: Normal heart sounds.   Pulmonary:      Breath sounds: Normal breath sounds.   Abdominal:      General: Bowel sounds are normal.      Palpations: Abdomen is soft.   Musculoskeletal:         General: Swelling, tenderness and signs of injury present.      Cervical back: Neck supple.      Comments: Swelling and bruising to the right groin   Skin:     Findings: Bruising present.   Neurological:      General: No focal deficit present.      Mental Status: He is alert and oriented to person, place, and time. Mental status is at baseline.      Cranial Nerves:  No cranial nerve deficit.      Sensory: No sensory deficit.      Motor: No weakness.      Coordination: Coordination normal.   Psychiatric:         Mood and Affect: Mood normal.         Behavior: Behavior normal.         Thought Content: Thought content normal.         Judgment: Judgment normal.         Fluids    Intake/Output Summary (Last 24 hours) at 2/16/2025 1108  Last data filed at 2/16/2025 1000  Gross per 24 hour   Intake 280 ml   Output --   Net 280 ml        Laboratory  Recent Labs     02/14/25  0516 02/15/25  0300 02/16/25  0258   WBC 9.1 10.0 12.6*   RBC 5.22 4.93 5.19   HEMOGLOBIN 16.3 15.4 15.9   HEMATOCRIT 47.7 43.9 46.8   MCV 91.4 89.0 90.2   MCH 31.2 31.2 30.6   MCHC 34.2 35.1 34.0   RDW 40.7 40.7 40.5   PLATELETCT 246 250 252   MPV 9.8 10.0 9.9     Recent Labs     02/14/25  0516 02/15/25  0300 02/16/25  0258   SODIUM 140 135 136   POTASSIUM 3.6 4.6 3.8   CHLORIDE 103 103 101   CO2 25 20 21   GLUCOSE 140* 105* 94   BUN 11 10 12   CREATININE 1.04 0.90 0.86   CALCIUM 9.5 9.4 9.6     Recent Labs     02/14/25  0516   APTT 24.0*   INR 1.11         Recent Labs     02/14/25  0516   TRIGLYCERIDE 81   HDL 46   LDL 63       Imaging  US-EXTREMITY ARTERY LOWER UNILAT RIGHT   Final Result      MR-BRAIN-W/O   Final Result      1.  14 mm focus of acute cerebral infarction in the right posterior basal ganglia. No hemorrhagic transformation.   2.  5 mm lacunar size focus of acute infarction at the right anterior frontal convexity. No hemorrhagic transformation. Right MCA sylvian branch territory.   3.  Remainder of the study is within normal limits.      CT-HEAD W/O   Final Result      No acute intracranial abnormality.               EC-ECHOCARDIOGRAM COMPLETE W/O CONT   Final Result      IR-THROMBO MECHANICAL ARTERY,INIT   Final Result      1.  Diagnostic angiogram demonstrates occlusion of few right frontal and right parietal M4 branches indicating recanalization of previously seen M3 occlusion likely from the  "intravenous TNK. The mechanical thrombectomy is not indicated.   2.  TICI 2c      DX-CHEST-PORTABLE (1 VIEW)   Final Result         1.  No acute cardiopulmonary disease.      CT-CTA NECK WITH & W/O-POST PROCESSING   Final Result         1.  CT angiogram of the neck within normal limits.         CT-CTA HEAD WITH & W/O-POST PROCESS   Final Result         1.  Segmental area of proximal right M3 narrowing suggesting component of occlusion or vasospasm      These findings were discussed with the patient's clinician, Juan Youssef, on 2/14/2025 5:38 AM.      CT-CEREBRAL PERFUSION ANALYSIS   Final Result         1. Cerebral blood flow less than 30% possibly representing completed infarct = 0 mL. Based on distribution of this finding, this is unlikely to represent artifact.      2. T Max more than 6 seconds possibly representing combination of completed infarct and ischemia = 31 mL. Based on the distribution of this finding, this is unlikely to represent artifact.      3. Mismatched volume possibly representing ischemic brain/penumbra= 31 mL      4.  Please note that this cerebral perfusion study and report is Quantitative and targets supratentorial (cerebral) perfusion for evaluation of large vessel territory acute ischemia/infarction. For example, lacunar infarcts, and brainstem/posterior fossa    ischemia/infarction are not evaluated on this study.  Data acquisition is subject to artifacts which can yield non-anatomically plausible perfusion maps which may be due to motion, bolus timing, signal to noise ratio, or other technical factors.    Perfusion map abnormalities which show non-anatomic distributions are likely artifact.   This study is not \"stand-alone\" and should only be utilized for diagnosis, management/treatment in correlation with CT, CTA, and/or MRI and clinical factors.         CT-HEAD W/O   Final Result         1.  No acute intracranial abnormality.               EC-LATRICIA W/ CONT    (Results Pending)    "     Assessment/Plan  * Acute ischemic stroke (HCC)- (present on admission)  Assessment & Plan  Presented with left sided weakness and mild confusion  CT imaging with distal right M2 thrombus  S/P tenecteplase at 0539 hrs 2/14  Emergent angiogram revealed TICI-2b flow after tenecteplase - no thrombectomy was necessary  NIH 0 currently   Echo normal EF and no evidence of interatrial shunt   MRI 14 mm focus of acute cerebral infarction in the right posterior basal ganglia. 5 mm lacunar size focus of acute infarction at the right  anterior frontal convexity. No hemorrhagic transformation   -Neuro checks every 4h  -Strict blood pressure control with goal -140  - Continue aspirin , lipid panel within goal reduce statin dosing from 80 to low dose 10 mg daily   -Pending hypercoagulable state work up and LATRICIA.  Plan is to perform LATRICIA Monday.  N.p.o. from midnight on Sunday  -PT/OT per protocol    Lymphocytosis  Assessment & Plan  White blood cell count within normal limits    Hypokalemia  Assessment & Plan  Now resolved current potassium level is 4.6    Migraines  Assessment & Plan  History of  Currently controlled  Outpatient follow-up         VTE prophylaxis:    enoxaparin ppx      I have performed a physical exam and reviewed and updated ROS and Plan today (2/16/2025). In review of yesterday's note (2/15/2025), there are no changes except as documented above.

## 2025-02-16 NOTE — CARE PLAN
The patient is Stable - Low risk of patient condition declining or worsening    Shift Goals  Clinical Goals: stable neuro, rest  Patient Goals: rest  Family Goals: updates    Progress made toward(s) clinical / shift goals:    Problem: Optimal Care of the Stroke Patient  Goal: Optimal acute care for the stroke patient  Outcome: Progressing     Problem: Knowledge Deficit - Stroke Education  Goal: Patient's knowledge of stroke and risk factors will improve  Outcome: Progressing     Problem: Neuro Status  Goal: Neuro status will remain stable or improve  Outcome: Progressing     Neuro status stable, no deficits. Independent in the room. No fall risk. Denies pain. Plan for LATRICIA 2/17 or 2/18. R groin site bruised, dressing changed, CDI. Ultrasound completed. NIHSS 0. Calls appropriately.     Patient is not progressing towards the following goals:

## 2025-02-16 NOTE — PROGRESS NOTES
Patient recently underwent an angiogram with access through the right groin.  Following the procedure, the patient reported a popping sensation in the right groin, followed by bruising and tightness in his right upper thigh.  I  ordered duplex ultrasound  to evaluate for pseudoaneurysm, but no evidence of pseudoaneurysm or arteriovenous fistula was found.  Upon examination today, there is some ecchymosis noted in the right upper groin/thigh though it is less tender compared to previous visits and it is soft to palpation.  No obvious signs of active bleeding are present.  Hemoglobin and hematocrit are stable

## 2025-02-16 NOTE — THERAPY
Occupational Therapy   Initial Evaluation     Patient Name: Daniel Hastings  Age:  24 y.o., Sex:  male  Medical Record #: [id number]  Today's Date: 2/16/2025     Precautions: Fall Risk    Assessment  Patient is 24 y.o. male admitted with left-sided weakness, mild confusion, left facial droop, and slurred speech. He reported going to bed normal around midnight, then tried to get up out of bed to get a drink of water when he felt weak.  He slumped to the ground.  No LOC. CT revealing a R distal M2 thrombus, with two areas of perfusion defect- R frontal and R parietal, however head CT is normal without evidence of early infarct, and CT perfusion with no identified core infarct. S/p IV-TNK. Pt went to IR for emergent thrombectomy, however, angiography revealed lysis of his thrombus with TICI-2b flow and thrombectomy was not performed. MRI that showed 14 mm focus of acute cerebral infarction in the right posterior basal ganglia.  No hemorrhagic transformation.  5 mm lacunar sized focus of acute infarction at the right anterior frontal convexity.  No hemorrhagic transformation. Pending LATRICIA. PMHx of migraines with aura. Pt seen for OT eval and tx. Pt currently resides with his SO and her family in a 1-story house and was independent with ADLs and IADLs.    During OT eval, pt demo ability to complete most ADLs, functional mobility, and txfs with sup-SBA and no AD. Pt with no LOB. Pt required CGA to complete LB dressing due to an increase in RLE pain/discomfort at incision site; reported SO is able to help after DC. Pt was provided education on role of acute OT, home safety, compensatory strategies to safely complete ADLs, and importance of OOB ADLs to reduce risk of deconditioning. Pt reported no additional concerns regarding ability to safely complete ADLs and txfs independently after DC. Pt has no further acute OT needs and anticipate that he will have no further OT needs after DC (denied need for HH services).    Patient  will not be actively followed for occupational therapy services at this time, however may be seen if requested by physician for 1 more visit within 30 days to address any discharge or equipment needs.     Plan    Occupational Therapy Initial Treatment Plan   Duration: Discharge Needs Only    DC Equipment Recommendations: Tub / Shower Seat  Discharge Recommendations: Anticipate that the patient will have no further occupational therapy needs after discharge from the hospital     Objective    Prior Living Situation   Prior Services Home-Independent   Housing / Facility 1 Story House   Steps Into Home 2   Steps In Home 0   Bathroom Set up Walk In Shower;Bathtub / Shower Combination  (Reported that he frequently uses tub/shower, but his SO's mom has a walk-in which he could likely use after DC)   Equipment Owned None   Lives with - Patient's Self Care Capacity Significant Other;Child Less than 18 Years of Age;Unrelated Adult   Comments Pt reported that he currently lives with his SO, their 10 month old child, and his SO's family. Pt reported that his SO is not currently working and is home throughout the day to provide assist as needed. Reported that her family can provide additional support when home.   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)   History of Falls   History of Falls Yes   Date of Last Fall   (Related to admit; denied having any additional falls)    Precautions   Precautions Fall Risk   Vitals   O2 Delivery Device None - Room Air   Pain 0 - 10 Group   Therapist Pain Assessment Post Activity Pain Same as Prior to Activity;Nurse Notified  (Not rated, agreeable to activity)   Cognition    Cognition / Consciousness WDL   Level of Consciousness Alert   Comments Pleasant, cooperative, and receptive to education   Active ROM Upper Body   Active ROM Upper Body  WDL   Dominant Hand Right   Strength Upper Body   Upper Body Strength  WDL   Sensation Upper Body   Upper Extremity Sensation  WDL   Coordination Upper Body   Coordination WDL   Balance Assessment   Sitting Balance (Static) Good   Sitting Balance (Dynamic) Fair +   Standing Balance (Static) Fair +   Standing Balance (Dynamic) Fair +   Weight Shift Sitting Good   Weight Shift Standing Good   Comments No AD, no LOB   Bed Mobility    Supine to Sit Supervised   Sit to Supine Supervised   Scooting Supervised   Rolling Supervised   Comments HOB slightly elevated   ADL Assessment   Eating Supervision   Grooming Supervision;Standing   Upper Body Dressing Supervision   Lower Body Dressing Contact Guard Assist  (Reported an increase in RLE at incision site with attempts to don socks. Reported that his SO is able to help at home. Edu on barbara-dressing techniques)   Toileting Supervision  (Able to complete pericare)   How much help from another person does the patient currently need...   6 Clicks Daily Activity Score 23   Functional Mobility   Sit to Stand Supervised   Bed, Chair, Wheelchair Transfer Supervised   Toilet Transfers Standby Assist  (Reported an increase in discomfort at incision site on RLE when completing descent. Required use of grab bars. Reported that he has a counter top near his toilet at home)   Mobility Functional mobility in room; no AD, no LOB   Activity Tolerance   Comments Functional for self care ADLs, no overt signs of WOB or fatigue with activity   Short Term Goals   Short Term Goal # 1 Pt will  have no further acute OT needs at [place]   Education Group   Education Provided Home Safety;Transfers;Role of Occupational Therapist;Activities of Daily Living;Pathology of bedrest   Role of Occupational Therapist Patient Response Patient;Acceptance;Explanation;Verbal Demonstration   Home Safety Patient Response Patient;Acceptance;Explanation;Verbal Demonstration  (Edu on benefits of a shower chair and having supervision when stepping in/out of shower to reduce risk of falls. Discussed strategies on how to get in/out of tub should he not be able to use walk-in)   Transfers Patient Response Patient;Acceptance;Explanation;Verbal Demonstration;Action Demonstration   ADL Patient Response Patient;Acceptance;Explanation;Verbal Demonstration;Action Demonstration  (Compensatory strategies to safely complete ADLs)   Pathology of Bedrest Patient Response Patient;Acceptance;Explanation;Verbal Demonstration;Action Demonstration  (Importance of frequent OOB ADLs with staff to reduce risk of deconditioning)

## 2025-02-16 NOTE — CARE PLAN
The patient is Stable - Low risk of patient condition declining or worsening    Shift Goals  Clinical Goals: Stable neuro status, monitor groin site, pain management  Patient Goals: Rest  Family Goals: MISHA    Progress made toward(s) clinical / shift goals:    Problem: Neuro Status  Goal: Neuro status will remain stable or improve  Description: Target End Date:  Prior to discharge or change in level of care    Document on Neuro assessment in the Assessment flowsheet    1.  Assess and monitor neurologic status per provider order/protocol/unit policy  2.  Assess level of consciousness and orientation  3.  Assess for speech, dysarthria, dysphagia, facial symmetry  4.  Assess visual field, eye movements, gaze preference, pupil reaction and size  5.  Assess muscle strength and motor response in all four extremities  6.  Assess for sensation (numbness and tingling)  7.  Assess basic neuro reflexes (cough, gag, corneal)  8.  Identify changes in neuro status and report to provider for testing/treatment orders  Outcome: Progressing     Problem: Self Care  Goal: Patient will have the ability to perform ADLs independently or with assistance (bathe, groom, dress, toilet and feed)  Description: Target End Date:  Prior to discharge or change in level of care    Document on ADL flowsheet    1.  Assess the capability and level of deficiency to perform ADLs  2.  Encourage family/care giver involvement  3.  Provide assistive devices  4.  Consider PT/OT evaluations  5.  Maintain support, give positive feedback, encourage self-care allowing extra time and verbal cuing as needed  6.  Avoid doing something for patients they can do themselves, but provide assistance as needed  7.  Assist in anticipating/planning individual needs  8.  Collaborate with Case Management and  to meet discharge needs  Outcome: Progressing       Patient is not progressing towards the following goals:

## 2025-02-16 NOTE — PROGRESS NOTES
Neurology Progress Note  Neurohospitalist Service, [facility]    Referring Physician: Eliezer Higgins D.O.    Chief Complaint   Patient presents with    Possible Stroke    Facial Droop    Weakness       HPI: Refer to initial documented Neurology H&P, as detailed in the patient's chart.    Interval History 2/16: No new events.      Review of systems: In addition to what is detailed in the HPI and/or updated in the interval history, all other systems reviewed and are negative.    Past Medical History:    has no past medical history on file.    FHx:  family history is not on file.    SHx:   reports that he has never smoked. He has never used smokeless tobacco. He reports current drug use. Drugs: Marijuana and Inhaled. He reports that he does not drink alcohol.    Medications:    Current Facility-Administered Medications:     aspirin (Asa) chewable tab 81 mg, 81 mg, Oral, DAILY, Chandan Crook M.D., 81 mg at 02/16/25 0503    enoxaparin (Lovenox) inj 40 mg, 40 mg, Subcutaneous, DAILY AT 1800, Eliezer Higgins D.O., 40 mg at 02/15/25 1716    Respiratory Therapy Consult, , Nebulization, Continuous RT, Marcos Reyez M.D.    acetaminophen (Tylenol) tablet 650 mg, 650 mg, Oral, Q6HRS PRN, Marcos Reyez M.D., 650 mg at 02/16/25 0503    ondansetron (Zofran) syringe/vial injection 4 mg, 4 mg, Intravenous, Q4HRS PRN, Marcos Reyez M.D., 4 mg at 02/14/25 0732    ondansetron (Zofran ODT) dispertab 4 mg, 4 mg, Oral, Q4HRS PRN, Marcos Reyez M.D.    atorvastatin (Lipitor) tablet 80 mg, 80 mg, Oral, Q EVENING, Marcos Reyez M.D., 80 mg at 02/15/25 1717    HYDROmorphone (Dilaudid) injection 0.5-1 mg, 0.5-1 mg, Intravenous, Q3HRS PRN, Marcos Reyez M.D., 1 mg at 02/14/25 1939    hydrALAZINE (Apresoline) injection 10-20 mg, 10-20 mg, Intravenous, Q4HRS PRN, Marcos Reyez M.D.    labetalol (Normodyne/Trandate) injection 10-20 mg, 10-20 mg, Intravenous, Q4HRS  PRN, Marcos Reyez M.D.    LORazepam (Ativan) injection 0.5 mg, 0.5 mg, Intravenous, Q4HRS PRN, Marcos Reyez M.D., 0.5 mg at 02/14/25 0814    Physical Examination:     Vitals:    02/15/25 1616 02/15/25 1949 02/15/25 2300 02/16/25 0324   BP: 123/81 (!) 140/87 113/68 116/72   Pulse: 74 71 (!) 57 65   Resp: 16 16  18   Temp: 37.1 °C (98.8 °F) 36.6 °C (97.8 °F)  36.9 °C (98.4 °F)   TempSrc: Temporal Temporal  Temporal   SpO2: 97% 95%  95%   Weight:       Height:               NEUROLOGICAL EXAM:     Patient is awake and alert fully oriented.  Speech is intact.  Able to repeat name objects.  Follows commands.  Pupils equal reactive.  Visual field intact.  She is extraocular muscles intact.  Face symmetrical.  Sensation intact.  Hearing intact.  Tongue midline.  Sustained antigravity strength in all 4.  Symmetrical.  Sensation intact.   Finger-to-nose testing was normal   Gait normal.    NIH 0        Objective Data:    Labs:  Lab Results   Component Value Date/Time    PROTHROMBTM 14.4 02/14/2025 05:16 AM    INR 1.11 02/14/2025 05:16 AM      Lab Results   Component Value Date/Time    WBC 12.6 (H) 02/16/2025 02:58 AM    RBC 5.19 02/16/2025 02:58 AM    HEMOGLOBIN 15.9 02/16/2025 02:58 AM    HEMATOCRIT 46.8 02/16/2025 02:58 AM    MCV 90.2 02/16/2025 02:58 AM    MCH 30.6 02/16/2025 02:58 AM    MCHC 34.0 02/16/2025 02:58 AM    MPV 9.9 02/16/2025 02:58 AM    NEUTSPOLYS 68.70 02/15/2025 03:00 AM    LYMPHOCYTES 21.30 (L) 02/15/2025 03:00 AM    MONOCYTES 9.20 02/15/2025 03:00 AM    EOSINOPHILS 0.20 02/15/2025 03:00 AM    BASOPHILS 0.30 02/15/2025 03:00 AM      Lab Results   Component Value Date/Time    SODIUM 136 02/16/2025 02:58 AM    POTASSIUM 3.8 02/16/2025 02:58 AM    CHLORIDE 101 02/16/2025 02:58 AM    CO2 21 02/16/2025 02:58 AM    GLUCOSE 94 02/16/2025 02:58 AM    BUN 12 02/16/2025 02:58 AM    CREATININE 0.86 02/16/2025 02:58 AM      Lab Results   Component Value Date/Time    CHOLSTRLTOT 125 02/14/2025 05:16  AM    LDL 63 02/14/2025 05:16 AM    HDL 46 02/14/2025 05:16 AM    TRIGLYCERIDE 81 02/14/2025 05:16 AM       Lab Results   Component Value Date/Time    ALKPHOSPHAT 72 02/14/2025 05:16 AM    ASTSGOT 26 02/14/2025 05:16 AM    ALTSGPT 16 02/14/2025 05:16 AM    TBILIRUBIN 0.5 02/14/2025 05:16 AM        Imaging/Testing:  US-EXTREMITY ARTERY LOWER UNILAT RIGHT   Final Result      MR-BRAIN-W/O   Final Result      1.  14 mm focus of acute cerebral infarction in the right posterior basal ganglia. No hemorrhagic transformation.   2.  5 mm lacunar size focus of acute infarction at the right anterior frontal convexity. No hemorrhagic transformation. Right MCA sylvian branch territory.   3.  Remainder of the study is within normal limits.      CT-HEAD W/O   Final Result      No acute intracranial abnormality.               EC-ECHOCARDIOGRAM COMPLETE W/O CONT   Final Result      IR-THROMBO MECHANICAL ARTERY,INIT   Final Result      1.  Diagnostic angiogram demonstrates occlusion of few right frontal and right parietal M4 branches indicating recanalization of previously seen M3 occlusion likely from the intravenous TNK. The mechanical thrombectomy is not indicated.   2.  TICI 2c      DX-CHEST-PORTABLE (1 VIEW)   Final Result         1.  No acute cardiopulmonary disease.      CT-CTA NECK WITH & W/O-POST PROCESSING   Final Result         1.  CT angiogram of the neck within normal limits.         CT-CTA HEAD WITH & W/O-POST PROCESS   Final Result         1.  Segmental area of proximal right M3 narrowing suggesting component of occlusion or vasospasm      These findings were discussed with the patient's clinician, Juan Youssef, on 2/14/2025 5:38 AM.      CT-CEREBRAL PERFUSION ANALYSIS   Final Result         1. Cerebral blood flow less than 30% possibly representing completed infarct = 0 mL. Based on distribution of this finding, this is unlikely to represent artifact.      2. T Max more than 6 seconds possibly representing  "combination of completed infarct and ischemia = 31 mL. Based on the distribution of this finding, this is unlikely to represent artifact.      3. Mismatched volume possibly representing ischemic brain/penumbra= 31 mL      4.  Please note that this cerebral perfusion study and report is Quantitative and targets supratentorial (cerebral) perfusion for evaluation of large vessel territory acute ischemia/infarction. For example, lacunar infarcts, and brainstem/posterior fossa    ischemia/infarction are not evaluated on this study.  Data acquisition is subject to artifacts which can yield non-anatomically plausible perfusion maps which may be due to motion, bolus timing, signal to noise ratio, or other technical factors.    Perfusion map abnormalities which show non-anatomic distributions are likely artifact.   This study is not \"stand-alone\" and should only be utilized for diagnosis, management/treatment in correlation with CT, CTA, and/or MRI and clinical factors.         CT-HEAD W/O   Final Result         1.  No acute intracranial abnormality.               EC-LATRICIA W/ CONT    (Results Pending)         Assessment and Plan:    24-year-old male presenting with left facial droop mild confusion.  CTA revealed a right distal M2 thrombus.  Status post tPA.  Patient has returned back to his baseline.  Thrombectomy was not performed due to M3 thrombus which was not retrievable.  At this time etiology is unknown.  TTE with bubble was normal yesterday.  I would like to proceed with a LATRICIA given his relatively young age and unclear source at this time.  Hypercoagulable panel still pending.  Okay to initiate aspirin today.  Okay to downgrade out of the ICU with acute 4-hour neurochecks.  MRI brain still pending.    Update 2/16.  No new clinical changes.  MRI brain confirmed infarct in the right basal ganglia and also more distally in the right anterior frontal lobe.  Etiology of this LVO is unknown at this time.  Echo was " unremarkable.  Pending LATRICIA which may be done tomorrow on Tuesday due to the holiday.  Hypercoag panel is also still pending including  APL.    Plan:  1.  Every 4 hours neurochecks  2.  Aspirin 81 mg daily.  3.  Maintain normoglycemia  4.  LATRICIA  5.  Hypercoagulable panel pending.  Factor V, factor II, Antithrombin III, APL  6.  PT/OT and speech therapy  7.  Maintain an LDL below 70.  Currently 63 without treatment           Spent over 56 minutes reviewing the case including CTA, CTP, examined the patient, labs since yesterday.  Going over the care plan with the ICU team.      This chart was partially generated using voice recognition technology and sound alike word replacement may be present, best efforts were made to make the chart accurate.    Chandan Crook MD  Board Certified Neurology, ABPN  t) [phone]

## 2025-02-16 NOTE — ASSESSMENT & PLAN NOTE
Presented with left sided weakness and mild confusion  CT imaging with distal right M2 thrombus  S/P tenecteplase at 0539 hrs 2/14  Emergent angiogram revealed TICI-2b flow after tenecteplase - no thrombectomy was necessary  NIH 0 currently   Echo normal EF and no evidence of interatrial shunt   MRI 14 mm focus of acute cerebral infarction in the right posterior basal ganglia. 5 mm lacunar size focus of acute infarction at the right  anterior frontal convexity. No hemorrhagic transformation   -Neuro checks every 4h  -Strict blood pressure control with goal -140  - Continue aspirin , lipid panel within goal reduce statin dosing from 80 to low dose 10 mg daily   -Pending hypercoagulable, some back thus far negative   -LATRICIA tomorrow, NPO at MN   -PT/OT per protocol, outpt therapy recommended

## 2025-02-16 NOTE — PROGRESS NOTES
The ICU team notified me today that the patient felt a popping sensation in his right groin, followed by bruising and tightness in his right upper thigh.  Upon evaluating the right groin, I observed scattered ecchymosis extending from the upper right thigh into the groin area.  The anterior upper thigh was soft to touch, however the medial right groin showed some slight firmness.  The patient reported mild to moderate pain upon palpation to this area.  An arterial ultrasound has been ordered.

## 2025-02-17 ENCOUNTER — APPOINTMENT (OUTPATIENT)
Dept: CARDIOLOGY | Facility: MEDICAL CENTER | Age: 25
DRG: 062 | End: 2025-02-17
Payer: COMMERCIAL

## 2025-02-17 LAB
ANION GAP SERPL CALC-SCNC: 12 MMOL/L (ref 7–16)
BUN SERPL-MCNC: 14 MG/DL (ref 8–22)
CALCIUM SERPL-MCNC: 9.5 MG/DL (ref 8.5–10.5)
CHLORIDE SERPL-SCNC: 101 MMOL/L (ref 96–112)
CO2 SERPL-SCNC: 23 MMOL/L (ref 20–33)
CREAT SERPL-MCNC: 1.03 MG/DL (ref 0.5–1.4)
ERYTHROCYTE [DISTWIDTH] IN BLOOD BY AUTOMATED COUNT: 40.7 FL (ref 35.9–50)
GFR SERPLBLD CREATININE-BSD FMLA CKD-EPI: 103 ML/MIN/1.73 M 2
GLUCOSE SERPL-MCNC: 90 MG/DL (ref 65–99)
HCT VFR BLD AUTO: 48.1 % (ref 42–52)
HGB BLD-MCNC: 16.3 G/DL (ref 14–18)
MCH RBC QN AUTO: 30.8 PG (ref 27–33)
MCHC RBC AUTO-ENTMCNC: 33.9 G/DL (ref 32.3–36.5)
MCV RBC AUTO: 90.9 FL (ref 81.4–97.8)
PLATELET # BLD AUTO: 254 K/UL (ref 164–446)
PMV BLD AUTO: 10.1 FL (ref 9–12.9)
POTASSIUM SERPL-SCNC: 4.6 MMOL/L (ref 3.6–5.5)
RBC # BLD AUTO: 5.29 M/UL (ref 4.7–6.1)
SODIUM SERPL-SCNC: 136 MMOL/L (ref 135–145)
WBC # BLD AUTO: 9.3 K/UL (ref 4.8–10.8)

## 2025-02-17 PROCEDURE — 80048 BASIC METABOLIC PNL TOTAL CA: CPT

## 2025-02-17 PROCEDURE — A9270 NON-COVERED ITEM OR SERVICE: HCPCS | Performed by: STUDENT IN AN ORGANIZED HEALTH CARE EDUCATION/TRAINING PROGRAM

## 2025-02-17 PROCEDURE — 97535 SELF CARE MNGMENT TRAINING: CPT

## 2025-02-17 PROCEDURE — 700102 HCHG RX REV CODE 250 W/ 637 OVERRIDE(OP): Performed by: STUDENT IN AN ORGANIZED HEALTH CARE EDUCATION/TRAINING PROGRAM

## 2025-02-17 PROCEDURE — A9270 NON-COVERED ITEM OR SERVICE: HCPCS | Performed by: PSYCHIATRY & NEUROLOGY

## 2025-02-17 PROCEDURE — 97162 PT EVAL MOD COMPLEX 30 MIN: CPT

## 2025-02-17 PROCEDURE — 770006 HCHG ROOM/CARE - MED/SURG/GYN SEMI*

## 2025-02-17 PROCEDURE — 85027 COMPLETE CBC AUTOMATED: CPT

## 2025-02-17 PROCEDURE — 99232 SBSQ HOSP IP/OBS MODERATE 35: CPT | Performed by: PSYCHIATRY & NEUROLOGY

## 2025-02-17 PROCEDURE — 99232 SBSQ HOSP IP/OBS MODERATE 35: CPT | Performed by: STUDENT IN AN ORGANIZED HEALTH CARE EDUCATION/TRAINING PROGRAM

## 2025-02-17 PROCEDURE — 700111 HCHG RX REV CODE 636 W/ 250 OVERRIDE (IP): Mod: JZ | Performed by: INTERNAL MEDICINE

## 2025-02-17 PROCEDURE — 700102 HCHG RX REV CODE 250 W/ 637 OVERRIDE(OP): Performed by: PSYCHIATRY & NEUROLOGY

## 2025-02-17 RX ADMIN — ASPIRIN 81 MG: 81 TABLET, CHEWABLE ORAL at 06:28

## 2025-02-17 RX ADMIN — ENOXAPARIN SODIUM 40 MG: 100 INJECTION SUBCUTANEOUS at 17:39

## 2025-02-17 RX ADMIN — ATORVASTATIN CALCIUM 10 MG: 10 TABLET, FILM COATED ORAL at 17:38

## 2025-02-17 RX ADMIN — OXYCODONE 5 MG: 5 TABLET ORAL at 06:28

## 2025-02-17 RX ADMIN — SENNOSIDES AND DOCUSATE SODIUM 2 TABLET: 50; 8.6 TABLET ORAL at 17:38

## 2025-02-17 RX ADMIN — POLYETHYLENE GLYCOL 3350 1 PACKET: 17 POWDER, FOR SOLUTION ORAL at 13:52

## 2025-02-17 ASSESSMENT — COGNITIVE AND FUNCTIONAL STATUS - GENERAL
SUGGESTED CMS G CODE MODIFIER MOBILITY: CH
MOBILITY SCORE: 24

## 2025-02-17 ASSESSMENT — GAIT ASSESSMENTS
GAIT LEVEL OF ASSIST: SUPERVISED
DISTANCE (FEET): 400
DEVIATION: ANTALGIC

## 2025-02-17 ASSESSMENT — ENCOUNTER SYMPTOMS
VOMITING: 0
SENSORY CHANGE: 0
FEVER: 0
ABDOMINAL PAIN: 0
DIZZINESS: 0
SHORTNESS OF BREATH: 0
FOCAL WEAKNESS: 0
BACK PAIN: 1
NAUSEA: 0

## 2025-02-17 ASSESSMENT — PAIN DESCRIPTION - PAIN TYPE
TYPE: ACUTE PAIN
TYPE: ACUTE PAIN

## 2025-02-17 NOTE — THERAPY
Physical Therapy   Initial Evaluation     Patient Name: Daniel Hastings  Age:  24 y.o., Sex:  male  Medical Record #: [id number]  Today's Date: 2/17/2025          Assessment  Patient is a 24 y.o. male with hx of migraines admitted eft-sided weakness, mild confusion, left facial droop, and slurred speech 2/2 R distal M2 thrombus s/p TNK (thrombectomy not needed). MRI brain showed acute infarct in R basal ganglia and right anterior frontal convexity with no hemorrhagic conversion. PT eval complete, pt currently presents at/near his functional baseline and completed all mobility at SPV level with no AD. Pt's gait is slightly antalgic due to pain in his right groin. Anticipate pt will be able to safely DC home with family support as needed once medically cleared. Patient will not be actively followed for physical therapy services at this time, however may be seen if requested by physician for 1 more visit within 30 days to address any discharge or equipment needs.     Plan    Physical Therapy Initial Treatment Plan   Duration: Discharge Needs Only    DC Equipment Recommendations: None  Discharge Recommendations: Recommend outpatient physical therapy services to address higher level deficits (if gait does not normalize)         Vitals   O2 (LPM) 0   O2 Delivery Device None - Room Air   Pain 0 - 10 Group   Therapist Pain Assessment   (no c/o pain)   Prior Living Situation   Prior Services Home-Independent   Housing / Facility 1 Story House   Steps Into Home 2   Steps In Home 0   Equipment Owned None   Lives with - Patient's Self Care Capacity Significant Other;Child Less than 18 Years of Age;Unrelated Adult   Comments t reported that he currently lives with his SO, their 10 month old child, and his SO's family. Pt reported that his SO is not currently working and is home throughout the day to provide assist as needed.   Prior Level of Functional Mobility   Bed Mobility Independent   Transfer Status Independent   Ambulation  Independent   Ambulation Distance community distances   Assistive Devices Used None   Stairs Independent   Cognition    Cognition / Consciousness WDL   Level of Consciousness Alert   Comments pleasant and participatory   Active ROM Lower Body    Active ROM Lower Body  WDL   Strength Lower Body   Lower Body Strength  WDL   Sensation Lower Body   Lower Extremity Sensation   WDL   Coordination Lower Body    Coordination Lower Body  WDL   Balance Assessment   Sitting Balance (Static) Good   Sitting Balance (Dynamic) Good   Standing Balance (Static) Fair +   Standing Balance (Dynamic) Fair +   Weight Shift Sitting Good   Weight Shift Standing Good   Comments no AD   Bed Mobility    Supine to Sit Supervised   Scooting Supervised   Rolling Supervised   Gait Analysis   Gait Level Of Assist Supervised   Assistive Device None   Distance (Feet) 400   # of Times Distance was Traveled 1   Deviation Antalgic  (mild on the right)   # of Stairs Climbed 0   Weight Bearing Status no restrictions   Comments reports gait is more due to pain at his femoral access site   Functional Mobility   Sit to Stand Supervised   Bed, Chair, Wheelchair Transfer Supervised   Mobility no AD   6 Clicks Assessment - How much HELP from from another person do you currently need... (If the patient hasn't done an activity recently, how much help from another person do you think he/she would need if he/she tried?)   Turning from your back to your side while in a flat bed without using bedrails? 4   Moving from lying on your back to sitting on the side of a flat bed without using bedrails? 4   Moving to and from a bed to a chair (including a wheelchair)? 4   Standing up from a chair using your arms (e.g., wheelchair, or bedside chair)? 4   Walking in hospital room? 4   Climbing 3-5 steps with a railing? 4   6 clicks Mobility Score 24   Activity Tolerance   Comments no overt SOB, pain, or fatigue   Education Group   Education Provided Role of Physical  Therapist   Role of Physical Therapist Patient Response Patient;Acceptance;Explanation;Verbal Demonstration   Additional Comments education with pt on expected pain with femoral access site, importance of time OOB and high frequency of mobility for gait normalization   Physical Therapy Initial Treatment Plan    Duration Discharge Needs Only   Problem List    Problems None   Anticipated Discharge Equipment and Recommendations   DC Equipment Recommendations None   Discharge Recommendations Recommend outpatient physical therapy services to address higher level deficits  (if gait does not normalize)   Interdisciplinary Plan of Care Collaboration   IDT Collaboration with  Nursing   Patient Position at End of Therapy Seated;Call Light within Reach;Tray Table within Reach;Phone within Reach   Collaboration Comments RN updated   Session Information   Date / Session Number  2/17- 1x only (DC needs)

## 2025-02-17 NOTE — PROGRESS NOTES
Neurology Progress Note  Neurohospitalist Service, [facility]    Referring Physician: Eliezer Higgins D.O.    Chief Complaint   Patient presents with    Possible Stroke    Facial Droop    Weakness       HPI: Refer to initial documented Neurology H&P, as detailed in the patient's chart.    Interval History 2/17: No new events.      Review of systems: In addition to what is detailed in the HPI and/or updated in the interval history, all other systems reviewed and are negative.    Past Medical History:    has no past medical history on file.    FHx:  family history is not on file.    SHx:   reports that he has never smoked. He has never used smokeless tobacco. He reports current drug use. Drugs: Marijuana and Inhaled. He reports that he does not drink alcohol.    Medications:    Current Facility-Administered Medications:     oxyCODONE immediate-release (Roxicodone) tablet 2.5-5 mg, 2.5-5 mg, Oral, Q6HRS PRN, Celena Funez M.D., 5 mg at 02/17/25 0628    atorvastatin (Lipitor) tablet 10 mg, 10 mg, Oral, Q EVENING, Celena Funez M.D., 10 mg at 02/16/25 1844    senna-docusate (Pericolace Or Senokot S) 8.6-50 MG per tablet 2 Tablet, 2 Tablet, Oral, Q EVENING, 2 Tablet at 02/16/25 1844 **AND** polyethylene glycol/lytes (Miralax) Packet 1 Packet, 1 Packet, Oral, QDAY PRN, Celena Funez M.D.    aspirin (Asa) chewable tab 81 mg, 81 mg, Oral, DAILY, Chandan Crook M.D., 81 mg at 02/17/25 0628    enoxaparin (Lovenox) inj 40 mg, 40 mg, Subcutaneous, DAILY AT 1800, Eliezer Higgins D.O., 40 mg at 02/16/25 1845    Respiratory Therapy Consult, , Nebulization, Continuous RT, Marcos Reyez M.D.    acetaminophen (Tylenol) tablet 650 mg, 650 mg, Oral, Q6HRS PRN, Marcos Reyez M.D., 650 mg at 02/16/25 0503    ondansetron (Zofran) syringe/vial injection 4 mg, 4 mg, Intravenous, Q4HRS PRN, Marcos Reyez M.D., 4 mg at 02/14/25 0732    ondansetron (Zofran ODT) dispertab 4 mg, 4 mg,  Oral, Q4HRS PRN, Marcos Reyez M.D.    hydrALAZINE (Apresoline) injection 10-20 mg, 10-20 mg, Intravenous, Q4HRS PRN, Marcos Reyez M.D.    labetalol (Normodyne/Trandate) injection 10-20 mg, 10-20 mg, Intravenous, Q4HRS PRN, Marcos Reyez M.D.    LORazepam (Ativan) injection 0.5 mg, 0.5 mg, Intravenous, Q4HRS PRN, Marcos Reyez M.D., 0.5 mg at 02/14/25 0814    Physical Examination:     Vitals:    02/16/25 2130 02/17/25 0437 02/17/25 0628 02/17/25 0810   BP:  123/83  129/75   Pulse:  (!) 55  69   Resp: 18 18 20 19   Temp:  36.5 °C (97.7 °F)  36.6 °C (97.9 °F)   TempSrc:  Temporal  Temporal   SpO2:  94%  93%   Weight:       Height:               NEUROLOGICAL EXAM:     Patient is awake and alert fully oriented.  Speech is intact.  Able to repeat name objects.  Follows commands.  Pupils equal reactive.  Visual field intact.  She is extraocular muscles intact.  Face symmetrical.  Sensation intact.  Hearing intact.  Tongue midline.  Sustained antigravity strength in all 4.  Symmetrical.  Sensation intact.   Finger-to-nose testing was normal   Gait normal.    NIH 0        Objective Data:    Labs:  Lab Results   Component Value Date/Time    PROTHROMBTM 14.4 02/14/2025 05:16 AM    INR 1.11 02/14/2025 05:16 AM      Lab Results   Component Value Date/Time    WBC 9.3 02/17/2025 05:56 AM    RBC 5.29 02/17/2025 05:56 AM    HEMOGLOBIN 16.3 02/17/2025 05:56 AM    HEMATOCRIT 48.1 02/17/2025 05:56 AM    MCV 90.9 02/17/2025 05:56 AM    MCH 30.8 02/17/2025 05:56 AM    MCHC 33.9 02/17/2025 05:56 AM    MPV 10.1 02/17/2025 05:56 AM    NEUTSPOLYS 68.70 02/15/2025 03:00 AM    LYMPHOCYTES 21.30 (L) 02/15/2025 03:00 AM    MONOCYTES 9.20 02/15/2025 03:00 AM    EOSINOPHILS 0.20 02/15/2025 03:00 AM    BASOPHILS 0.30 02/15/2025 03:00 AM      Lab Results   Component Value Date/Time    SODIUM 136 02/17/2025 05:56 AM    POTASSIUM 4.6 02/17/2025 05:56 AM    CHLORIDE 101 02/17/2025 05:56 AM    CO2 23 02/17/2025  05:56 AM    GLUCOSE 90 02/17/2025 05:56 AM    BUN 14 02/17/2025 05:56 AM    CREATININE 1.03 02/17/2025 05:56 AM      Lab Results   Component Value Date/Time    CHOLSTRLTOT 125 02/14/2025 05:16 AM    LDL 63 02/14/2025 05:16 AM    HDL 46 02/14/2025 05:16 AM    TRIGLYCERIDE 81 02/14/2025 05:16 AM       Lab Results   Component Value Date/Time    ALKPHOSPHAT 72 02/14/2025 05:16 AM    ASTSGOT 26 02/14/2025 05:16 AM    ALTSGPT 16 02/14/2025 05:16 AM    TBILIRUBIN 0.5 02/14/2025 05:16 AM        Imaging/Testing:  US-EXTREMITY ARTERY LOWER UNILAT RIGHT   Final Result      MR-BRAIN-W/O   Final Result      1.  14 mm focus of acute cerebral infarction in the right posterior basal ganglia. No hemorrhagic transformation.   2.  5 mm lacunar size focus of acute infarction at the right anterior frontal convexity. No hemorrhagic transformation. Right MCA sylvian branch territory.   3.  Remainder of the study is within normal limits.      CT-HEAD W/O   Final Result      No acute intracranial abnormality.               EC-ECHOCARDIOGRAM COMPLETE W/O CONT   Final Result      IR-THROMBO MECHANICAL ARTERY,INIT   Final Result      1.  Diagnostic angiogram demonstrates occlusion of few right frontal and right parietal M4 branches indicating recanalization of previously seen M3 occlusion likely from the intravenous TNK. The mechanical thrombectomy is not indicated.   2.  TICI 2c      DX-CHEST-PORTABLE (1 VIEW)   Final Result         1.  No acute cardiopulmonary disease.      CT-CTA NECK WITH & W/O-POST PROCESSING   Final Result         1.  CT angiogram of the neck within normal limits.         CT-CTA HEAD WITH & W/O-POST PROCESS   Final Result         1.  Segmental area of proximal right M3 narrowing suggesting component of occlusion or vasospasm      These findings were discussed with the patient's clinician, Juan Youssef, on 2/14/2025 5:38 AM.      CT-CEREBRAL PERFUSION ANALYSIS   Final Result         1. Cerebral blood flow less than 30%  "possibly representing completed infarct = 0 mL. Based on distribution of this finding, this is unlikely to represent artifact.      2. T Max more than 6 seconds possibly representing combination of completed infarct and ischemia = 31 mL. Based on the distribution of this finding, this is unlikely to represent artifact.      3. Mismatched volume possibly representing ischemic brain/penumbra= 31 mL      4.  Please note that this cerebral perfusion study and report is Quantitative and targets supratentorial (cerebral) perfusion for evaluation of large vessel territory acute ischemia/infarction. For example, lacunar infarcts, and brainstem/posterior fossa    ischemia/infarction are not evaluated on this study.  Data acquisition is subject to artifacts which can yield non-anatomically plausible perfusion maps which may be due to motion, bolus timing, signal to noise ratio, or other technical factors.    Perfusion map abnormalities which show non-anatomic distributions are likely artifact.   This study is not \"stand-alone\" and should only be utilized for diagnosis, management/treatment in correlation with CT, CTA, and/or MRI and clinical factors.         CT-HEAD W/O   Final Result         1.  No acute intracranial abnormality.               EC-LATRICIA W/ CONT    (Results Pending)         Assessment and Plan:    24-year-old male presenting with left facial droop mild confusion.  CTA revealed a right distal M2 thrombus.  Status post tPA.  Patient has returned back to his baseline.  Thrombectomy was not performed due to M3 thrombus which was not retrievable.  At this time etiology is unknown.  TTE with bubble was normal yesterday.  I would like to proceed with a LATRICIA given his relatively young age and unclear source at this time.  Hypercoagulable panel still pending.  Okay to initiate aspirin today.  Okay to downgrade out of the ICU with acute 4-hour neurochecks.  MRI brain still pending.    Update 2/16.  No new clinical changes. "  MRI brain confirmed infarct in the right basal ganglia and also more distally in the right anterior frontal lobe.  Etiology of this LVO is unknown at this time.  Echo was unremarkable.  Pending LATRICIA which may be done tomorrow on Tuesday due to the holiday.  Hypercoag panel is also still pending including  APL.    Update 2/17.  Waiting on LATRICIA.  May occur today or tomorrow.  Some of the hypercoagulable panel came back all which is negative.  The APL still pending.  At this time there is no good etiology for why the patient had an LDL.  Will wait for the LATRICIA results.  However may need to consider empirically treating with anticoagulation given his young age and lack of risk factors.  Waiting on APL to make sure we do not need to use Coumadin instead of Eliquis.    Plan:  1.  Every 4 hours neurochecks  2.  Aspirin 81 mg daily.  3.  Maintain normoglycemia  4.  LATRICIA  5.  Hypercoagulable panel pending.  APL  6.  PT/OT and speech therapy  7.  Maintain an LDL below 70.  Currently 63 without treatment           Spent over 56 minutes reviewing the case including CTA, CTP, examined the patient, labs since yesterday.  Going over the care plan with the ICU team.      This chart was partially generated using voice recognition technology and sound alike word replacement may be present, best efforts were made to make the chart accurate.    Chandan Crook MD  Board Certified Neurology, ABPN  (t) [phone]

## 2025-02-17 NOTE — PROGRESS NOTES
Hospital Medicine Daily Progress Note    Date of Service  2/17/2025    Chief Complaint  Daniel Hastings is a 24 y.o. male admitted 2/14/2025 with left sided weakness, confusion    Hospital Course  24 y.o. male with past ministry of migraines who presented to the hospital on 2/14/2025 with left-sided weakness and mild confusion.  Patient given emergent CT scan that revealed a distal right M2 occlusion and perfusion defect.  Mr. Hastings received TNKase and went to IR for emergent thrombectomy.  Angiography revealed lysis of his thrombus with TICI 2B flow and thrombectomy was not performed.  He admitted to ICU for post TNKase administration and close monitoring.  He had some bleeding noted the Rt femoral access site, this was sandbagged. US does not show pseudoaneurysms or fistulization.   MRI completed and  showed 14 mm focus of acute cerebral infarction in the right posterior basal ganglia.  No hemorrhagic transformation.  5 mm lacunar sized focus of acute infarction at the right anterior frontal convexity.  No hemorrhagic transformation.  Neurology is following, he has been started on aspirin, given his age LATRICIA was recommended, his TTE was unremarkable. He has been transferred to the neuroscience floor.     Interval Problem Update  Pt seen and examined he is awake and alert, no acute issues. Unable to have LATRICIA today. No groin pain, still with bruising but swelling improved.    - LATRICIA planned for tomorrow, NPO at [facility]   - continue aspirin, statin   - PT/OT,  outpatient therapy recommended, will place order on discharge   - hypercoagulability testing thus far negative, protein C/S and anticardiolipin and B-2-glycoprotein negative, pending antiphospholipid panel   - pt requesting work not, this was entered into epic.       I have discussed this patient's plan of care and discharge plan at IDT rounds today with Case Management, Nursing, Nursing leadership, and other members of the IDT  team.    Consultants/Specialty  cardiology, critical care, and neurology    Code Status  Full Code    Disposition  The patient is not medically cleared for discharge to home or a post-acute facility.      I have placed the appropriate orders for post-discharge needs.    Review of Systems  Review of Systems   Constitutional:  Negative for fever.   Respiratory:  Negative for shortness of breath.    Cardiovascular:  Positive for leg swelling. Negative for chest pain.   Gastrointestinal:  Negative for abdominal pain, nausea and vomiting.   Genitourinary:  Negative for dysuria and urgency.   Musculoskeletal:  Positive for back pain.   Neurological:  Negative for dizziness, sensory change and focal weakness.        Physical Exam  Temp:  [36.5 °C (97.7 °F)-37.2 °C (98.9 °F)] 36.6 °C (97.9 °F)  Pulse:  [55-71] 69  Resp:  [18-20] 19  BP: (123-133)/(75-83) 129/75  SpO2:  [93 %-98 %] 93 %    Physical Exam  Vitals and nursing note reviewed.   Constitutional:       Appearance: He is not ill-appearing.      Comments: Thin, young  male, no acute distress    HENT:      Head: Normocephalic and atraumatic.      Mouth/Throat:      Mouth: Mucous membranes are moist.      Pharynx: Oropharynx is clear.   Eyes:      Conjunctiva/sclera: Conjunctivae normal.      Comments: Wears corrective lenses    Cardiovascular:      Rate and Rhythm: Normal rate and regular rhythm.      Heart sounds: Normal heart sounds.   Pulmonary:      Breath sounds: Normal breath sounds.   Abdominal:      General: Bowel sounds are normal.      Palpations: Abdomen is soft.   Musculoskeletal:         General: Swelling, tenderness and signs of injury present.      Cervical back: Neck supple.      Comments: Swelling and bruising to the right groin   Skin:     Findings: Bruising present.   Neurological:      General: No focal deficit present.      Mental Status: He is alert and oriented to person, place, and time. Mental status is at baseline.      Cranial Nerves:  No cranial nerve deficit.      Sensory: No sensory deficit.      Motor: No weakness.      Coordination: Coordination normal.   Psychiatric:         Mood and Affect: Mood normal.         Behavior: Behavior normal.         Thought Content: Thought content normal.         Judgment: Judgment normal.         Fluids    Intake/Output Summary (Last 24 hours) at 2/17/2025 1524  Last data filed at 2/16/2025 2100  Gross per 24 hour   Intake 400 ml   Output --   Net 400 ml        Laboratory  Recent Labs     02/15/25  0300 02/16/25  0258 02/17/25  0556   WBC 10.0 12.6* 9.3   RBC 4.93 5.19 5.29   HEMOGLOBIN 15.4 15.9 16.3   HEMATOCRIT 43.9 46.8 48.1   MCV 89.0 90.2 90.9   MCH 31.2 30.6 30.8   MCHC 35.1 34.0 33.9   RDW 40.7 40.5 40.7   PLATELETCT 250 252 254   MPV 10.0 9.9 10.1     Recent Labs     02/15/25  0300 02/16/25  0258 02/17/25  0556   SODIUM 135 136 136   POTASSIUM 4.6 3.8 4.6   CHLORIDE 103 101 101   CO2 20 21 23   GLUCOSE 105* 94 90   BUN 10 12 14   CREATININE 0.90 0.86 1.03   CALCIUM 9.4 9.6 9.5                       Imaging  US-EXTREMITY ARTERY LOWER UNILAT RIGHT   Final Result      MR-BRAIN-W/O   Final Result      1.  14 mm focus of acute cerebral infarction in the right posterior basal ganglia. No hemorrhagic transformation.   2.  5 mm lacunar size focus of acute infarction at the right anterior frontal convexity. No hemorrhagic transformation. Right MCA sylvian branch territory.   3.  Remainder of the study is within normal limits.      CT-HEAD W/O   Final Result      No acute intracranial abnormality.               EC-ECHOCARDIOGRAM COMPLETE W/O CONT   Final Result      IR-THROMBO MECHANICAL ARTERY,INIT   Final Result      1.  Diagnostic angiogram demonstrates occlusion of few right frontal and right parietal M4 branches indicating recanalization of previously seen M3 occlusion likely from the intravenous TNK. The mechanical thrombectomy is not indicated.   2.  TICI 2c      DX-CHEST-PORTABLE (1 VIEW)   Final  "Result         1.  No acute cardiopulmonary disease.      CT-CTA NECK WITH & W/O-POST PROCESSING   Final Result         1.  CT angiogram of the neck within normal limits.         CT-CTA HEAD WITH & W/O-POST PROCESS   Final Result         1.  Segmental area of proximal right M3 narrowing suggesting component of occlusion or vasospasm      These findings were discussed with the patient's clinician, Juna Youssef, on 2/14/2025 5:38 AM.      CT-CEREBRAL PERFUSION ANALYSIS   Final Result         1. Cerebral blood flow less than 30% possibly representing completed infarct = 0 mL. Based on distribution of this finding, this is unlikely to represent artifact.      2. T Max more than 6 seconds possibly representing combination of completed infarct and ischemia = 31 mL. Based on the distribution of this finding, this is unlikely to represent artifact.      3. Mismatched volume possibly representing ischemic brain/penumbra= 31 mL      4.  Please note that this cerebral perfusion study and report is Quantitative and targets supratentorial (cerebral) perfusion for evaluation of large vessel territory acute ischemia/infarction. For example, lacunar infarcts, and brainstem/posterior fossa    ischemia/infarction are not evaluated on this study.  Data acquisition is subject to artifacts which can yield non-anatomically plausible perfusion maps which may be due to motion, bolus timing, signal to noise ratio, or other technical factors.    Perfusion map abnormalities which show non-anatomic distributions are likely artifact.   This study is not \"stand-alone\" and should only be utilized for diagnosis, management/treatment in correlation with CT, CTA, and/or MRI and clinical factors.         CT-HEAD W/O   Final Result         1.  No acute intracranial abnormality.               EC-LATRICIA W/ CONT    (Results Pending)        Assessment/Plan  * Acute ischemic stroke (HCC)- (present on admission)  Assessment & Plan  Presented with left sided " weakness and mild confusion  CT imaging with distal right M2 thrombus  S/P tenecteplase at 0539 hrs 2/14  Emergent angiogram revealed TICI-2b flow after tenecteplase - no thrombectomy was necessary  NIH 0 currently   Echo normal EF and no evidence of interatrial shunt   MRI 14 mm focus of acute cerebral infarction in the right posterior basal ganglia. 5 mm lacunar size focus of acute infarction at the right  anterior frontal convexity. No hemorrhagic transformation   -Neuro checks every 4h  -Strict blood pressure control with goal -140  - Continue aspirin , lipid panel within goal reduce statin dosing from 80 to low dose 10 mg daily   -Pending hypercoagulable, some back thus far negative   -LATRICIA tomorrow, NPO at [facility]   -PT/OT per protocol, outpt therapy recommended    Lymphocytosis  Assessment & Plan  White blood cell count within normal limits    Hypokalemia  Assessment & Plan  Now resolved current potassium level is 4.6    Migraines  Assessment & Plan  History of  Currently controlled  Outpatient follow-up         VTE prophylaxis:    enoxaparin ppx      I have performed a physical exam and reviewed and updated ROS and Plan today (2/17/2025). In review of yesterday's note (2/16/2025), there are no changes except as documented above.

## 2025-02-17 NOTE — DISCHARGE PLANNING
is not requiring 2 of 3 disciplines.  TCC will no longer follow.  Please reach out to myself with any questions.

## 2025-02-17 NOTE — CARE PLAN
Problem: Pain - Standard  Goal: Alleviation of pain or a reduction in pain to the patient’s comfort goal  Outcome: Progressing     Problem: Optimal Care of the Stroke Patient  Goal: Optimal acute care for the stroke patient  Outcome: Progressing     Problem: Knowledge Deficit - Stroke Education  Goal: Patient's knowledge of stroke and risk factors will improve  Outcome: Progressing     Problem: Neuro Status  Goal: Neuro status will remain stable or improve  Outcome: Progressing   The patient is Stable - Low risk of patient condition declining or worsening    Shift Goals  Clinical Goals: pain management, terese  Patient Goals: terese, eat  Family Goals: avelina    Progress made toward(s) clinical / shift goals:  understands poc. Stable neuro status. Pending TERESE    Patient is not progressing towards the following goals:

## 2025-02-17 NOTE — CARE PLAN
The patient is Stable - Low risk of patient condition declining or worsening    Shift Goals  Clinical Goals: rest, pain control, stable neuro  Patient Goals: rest, pain control  Family Goals: avelina    Progress made toward(s) clinical / shift goals:    Problem: Optimal Care of the Stroke Patient  Goal: Optimal acute care for the stroke patient  Outcome: Progressing     Problem: Knowledge Deficit - Stroke Education  Goal: Patient's knowledge of stroke and risk factors will improve  Outcome: Progressing     Problem: Neuro Status  Goal: Neuro status will remain stable or improve  Outcome: Progressing     Neuro exam stable. No deficits. NIHSS 0. Up self. Given oxycodone for back pain w/ good effect. NPO at midnight for LATRICIA.     Patient is not progressing towards the following goals:

## 2025-02-18 ENCOUNTER — ANESTHESIA EVENT (OUTPATIENT)
Dept: CARDIOLOGY | Facility: MEDICAL CENTER | Age: 25
DRG: 062 | End: 2025-02-18
Payer: COMMERCIAL

## 2025-02-18 ENCOUNTER — APPOINTMENT (OUTPATIENT)
Dept: CARDIOLOGY | Facility: MEDICAL CENTER | Age: 25
DRG: 062 | End: 2025-02-18
Payer: COMMERCIAL

## 2025-02-18 ENCOUNTER — ANESTHESIA (OUTPATIENT)
Dept: CARDIOLOGY | Facility: MEDICAL CENTER | Age: 25
DRG: 062 | End: 2025-02-18
Payer: COMMERCIAL

## 2025-02-18 ENCOUNTER — PHARMACY VISIT (OUTPATIENT)
Dept: PHARMACY | Facility: MEDICAL CENTER | Age: 25
End: 2025-02-18
Payer: MEDICARE

## 2025-02-18 VITALS
RESPIRATION RATE: 18 BRPM | OXYGEN SATURATION: 99 % | SYSTOLIC BLOOD PRESSURE: 107 MMHG | HEART RATE: 66 BPM | HEIGHT: 71 IN | TEMPERATURE: 97.2 F | BODY MASS INDEX: 18.25 KG/M2 | DIASTOLIC BLOOD PRESSURE: 65 MMHG | WEIGHT: 130.38 LBS

## 2025-02-18 PROCEDURE — 99223 1ST HOSP IP/OBS HIGH 75: CPT | Performed by: INTERNAL MEDICINE

## 2025-02-18 PROCEDURE — 160035 HCHG PACU - 1ST 60 MINS PHASE I

## 2025-02-18 PROCEDURE — A9270 NON-COVERED ITEM OR SERVICE: HCPCS | Performed by: PSYCHIATRY & NEUROLOGY

## 2025-02-18 PROCEDURE — 700111 HCHG RX REV CODE 636 W/ 250 OVERRIDE (IP): Performed by: ANESTHESIOLOGY

## 2025-02-18 PROCEDURE — B245ZZ4 ULTRASONOGRAPHY OF LEFT HEART, TRANSESOPHAGEAL: ICD-10-PCS | Performed by: INTERNAL MEDICINE

## 2025-02-18 PROCEDURE — 700102 HCHG RX REV CODE 250 W/ 637 OVERRIDE(OP): Performed by: PSYCHIATRY & NEUROLOGY

## 2025-02-18 PROCEDURE — 99232 SBSQ HOSP IP/OBS MODERATE 35: CPT | Performed by: PSYCHIATRY & NEUROLOGY

## 2025-02-18 PROCEDURE — RXMED WILLOW AMBULATORY MEDICATION CHARGE: Performed by: GENERAL PRACTICE

## 2025-02-18 PROCEDURE — 93325 DOPPLER ECHO COLOR FLOW MAPG: CPT

## 2025-02-18 PROCEDURE — 99239 HOSP IP/OBS DSCHRG MGMT >30: CPT | Performed by: GENERAL PRACTICE

## 2025-02-18 PROCEDURE — 700101 HCHG RX REV CODE 250: Performed by: ANESTHESIOLOGY

## 2025-02-18 PROCEDURE — 160002 HCHG RECOVERY MINUTES (STAT)

## 2025-02-18 PROCEDURE — 700105 HCHG RX REV CODE 258: Performed by: ANESTHESIOLOGY

## 2025-02-18 RX ORDER — LIDOCAINE HYDROCHLORIDE 20 MG/ML
INJECTION, SOLUTION EPIDURAL; INFILTRATION; INTRACAUDAL; PERINEURAL PRN
Status: DISCONTINUED | OUTPATIENT
Start: 2025-02-18 | End: 2025-02-18 | Stop reason: SURG

## 2025-02-18 RX ORDER — DIPHENHYDRAMINE HYDROCHLORIDE 50 MG/ML
12.5 INJECTION, SOLUTION INTRAMUSCULAR; INTRAVENOUS
Status: DISCONTINUED | OUTPATIENT
Start: 2025-02-18 | End: 2025-02-18 | Stop reason: HOSPADM

## 2025-02-18 RX ORDER — SODIUM CHLORIDE, SODIUM LACTATE, POTASSIUM CHLORIDE, CALCIUM CHLORIDE 600; 310; 30; 20 MG/100ML; MG/100ML; MG/100ML; MG/100ML
INJECTION, SOLUTION INTRAVENOUS
Status: DISCONTINUED | OUTPATIENT
Start: 2025-02-18 | End: 2025-02-18 | Stop reason: SURG

## 2025-02-18 RX ORDER — ONDANSETRON 2 MG/ML
4 INJECTION INTRAMUSCULAR; INTRAVENOUS
Status: DISCONTINUED | OUTPATIENT
Start: 2025-02-18 | End: 2025-02-18 | Stop reason: HOSPADM

## 2025-02-18 RX ORDER — HALOPERIDOL 5 MG/ML
1 INJECTION INTRAMUSCULAR
Status: DISCONTINUED | OUTPATIENT
Start: 2025-02-18 | End: 2025-02-18 | Stop reason: HOSPADM

## 2025-02-18 RX ORDER — SODIUM CHLORIDE, SODIUM LACTATE, POTASSIUM CHLORIDE, CALCIUM CHLORIDE 600; 310; 30; 20 MG/100ML; MG/100ML; MG/100ML; MG/100ML
INJECTION, SOLUTION INTRAVENOUS CONTINUOUS
Status: DISCONTINUED | OUTPATIENT
Start: 2025-02-18 | End: 2025-02-18 | Stop reason: HOSPADM

## 2025-02-18 RX ADMIN — PROPOFOL 50 MG: 10 INJECTION, EMULSION INTRAVENOUS at 10:41

## 2025-02-18 RX ADMIN — PROPOFOL 50 MG: 10 INJECTION, EMULSION INTRAVENOUS at 10:56

## 2025-02-18 RX ADMIN — LIDOCAINE HYDROCHLORIDE 40 MG: 20 INJECTION, SOLUTION EPIDURAL; INFILTRATION; INTRACAUDAL; PERINEURAL at 10:41

## 2025-02-18 RX ADMIN — PROPOFOL 50 MG: 10 INJECTION, EMULSION INTRAVENOUS at 10:43

## 2025-02-18 RX ADMIN — SODIUM CHLORIDE, POTASSIUM CHLORIDE, SODIUM LACTATE AND CALCIUM CHLORIDE: 600; 310; 30; 20 INJECTION, SOLUTION INTRAVENOUS at 10:36

## 2025-02-18 RX ADMIN — PROPOFOL 50 MG: 10 INJECTION, EMULSION INTRAVENOUS at 10:53

## 2025-02-18 RX ADMIN — PROPOFOL 50 MG: 10 INJECTION, EMULSION INTRAVENOUS at 10:50

## 2025-02-18 RX ADMIN — PROPOFOL 50 MG: 10 INJECTION, EMULSION INTRAVENOUS at 10:42

## 2025-02-18 RX ADMIN — PROPOFOL 50 MG: 10 INJECTION, EMULSION INTRAVENOUS at 10:47

## 2025-02-18 RX ADMIN — ASPIRIN 81 MG: 81 TABLET, CHEWABLE ORAL at 05:02

## 2025-02-18 RX ADMIN — PROPOFOL 50 MG: 10 INJECTION, EMULSION INTRAVENOUS at 10:45

## 2025-02-18 ASSESSMENT — PAIN SCALES - GENERAL: PAIN_LEVEL: 0

## 2025-02-18 NOTE — DISCHARGE SUMMARY
Discharge Summary    CHIEF COMPLAINT ON ADMISSION  Chief Complaint   Patient presents with    Possible Stroke    Facial Droop    Weakness       Reason for Admission  Stroke     Admission Date  2/14/2025    CODE STATUS  Full Code    HPI & HOSPITAL COURSE  This is a 24-year-old male with past medical history of migraines and prediabetes A1C 6.1 who was admitted on 2/14 with left-sided weakness.    CT imaging revealed distal right M2 occlusion.  Patient was administered TNKase and underwent emergent IR thrombectomy, \angiography revealed lysis of his thrombus with TICI 2B flow and thrombectomy was not performed.  He admitted to ICU for post TNKase administration and close monitoring.  He had some bleeding noted the Rt femoral access site, this was sandbagged. US does not show pseudoaneurysms or fistulization.     MRI completed and  showed 14 mm focus of acute cerebral infarction in the right posterior basal ganglia.  No hemorrhagic transformation.  5 mm lacunar sized focus of acute infarction at the right anterior frontal convexity.  No hemorrhagic transformation.    Neurology was consulted, TTE with bubble was normal. A1C 6.1. LDL 63.  Given unclear etiology of CVA, LATRICIA was recommended. Hypercoagulable panel thus far negative, APL pending.  Patient initiated on Eliquis.  Patient had LATRICIA performed which noted normal LVEF,  no presence of left atrial appendage thrombus however did note presence of PFO with bidirectional shunt.  Cardiology referral placed for outpatient structural heart team for evaluation of PFO closure with Dr. Gasca.    Therefore, he is discharged in good and stable condition to home with close outpatient follow-up.    The patient met 2-midnight criteria for an inpatient stay at the time of discharge.    Discharge Date  2/18/2025    FOLLOW UP ITEMS POST DISCHARGE  Primary care physician  Cardiology  Neurology    DISCHARGE DIAGNOSES  Principal Problem:    Acute ischemic stroke (HCC) (POA:  Yes)  Active Problems:    Migraines (POA: Unknown)    Hypokalemia (POA: Unknown)    Lymphocytosis (POA: Unknown)    Acute CVA (cerebrovascular accident) (HCC) (POA: Yes)  Resolved Problems:    * No resolved hospital problems. *      FOLLOW UP  [facility]  [address]  [facility] [address]  [phone]  Follow up      [facility]  [address]  [facility] [address]  [phone]  Follow up        MEDICATIONS ON DISCHARGE     Medication List        START taking these medications        Instructions   Eliquis 5 MG Tabs  Generic drug: apixaban   Take 1 Tablet by mouth 2 times a day for 90 days. Indications: Thromboembolism secondary to Atrial Fibrillation  Dose: 5 mg            STOP taking these medications      acetaminophen 325 MG Tabs  Commonly known as: Tylenol              Allergies  No Known Allergies    DIET  Orders Placed This Encounter   Procedures    Diet Order Diet: Regular     Standing Status:   Standing     Number of Occurrences:   1     Diet::   Regular [1]       ACTIVITY  As tolerated.  Weight bearing as tolerated    CONSULTATIONS  Neurology  Cardiology    PROCEDURES  Neurology  Cardiology    LABORATORY  Lab Results   Component Value Date    SODIUM 136 02/17/2025    POTASSIUM 4.6 02/17/2025    CHLORIDE 101 02/17/2025    CO2 23 02/17/2025    GLUCOSE 90 02/17/2025    BUN 14 02/17/2025    CREATININE 1.03 02/17/2025        Lab Results   Component Value Date    WBC 9.3 02/17/2025    HEMOGLOBIN 16.3 02/17/2025    HEMATOCRIT 48.1 02/17/2025    PLATELETCT 254 02/17/2025      US-EXTREMITY ARTERY LOWER UNILAT RIGHT   Final Result      MR-BRAIN-W/O   Final Result      1.  14 mm focus of acute cerebral infarction in the right posterior basal ganglia. No hemorrhagic transformation.   2.  5 mm lacunar size focus of acute infarction at the right anterior frontal convexity. No hemorrhagic transformation. Right MCA sylvian branch territory.   3.  Remainder  of the study is within normal limits.      CT-HEAD W/O   Final Result      No acute intracranial abnormality.               EC-ECHOCARDIOGRAM COMPLETE W/O CONT   Final Result      IR-THROMBO MECHANICAL ARTERY,INIT   Final Result      1.  Diagnostic angiogram demonstrates occlusion of few right frontal and right parietal M4 branches indicating recanalization of previously seen M3 occlusion likely from the intravenous TNK. The mechanical thrombectomy is not indicated.   2.  TICI 2c      DX-CHEST-PORTABLE (1 VIEW)   Final Result         1.  No acute cardiopulmonary disease.      CT-CTA NECK WITH & W/O-POST PROCESSING   Final Result         1.  CT angiogram of the neck within normal limits.         CT-CTA HEAD WITH & W/O-POST PROCESS   Final Result         1.  Segmental area of proximal right M3 narrowing suggesting component of occlusion or vasospasm      These findings were discussed with the patient's clinician, Juan Youssef, on 2/14/2025 5:38 AM.      CT-CEREBRAL PERFUSION ANALYSIS   Final Result         1. Cerebral blood flow less than 30% possibly representing completed infarct = 0 mL. Based on distribution of this finding, this is unlikely to represent artifact.      2. T Max more than 6 seconds possibly representing combination of completed infarct and ischemia = 31 mL. Based on the distribution of this finding, this is unlikely to represent artifact.      3. Mismatched volume possibly representing ischemic brain/penumbra= 31 mL      4.  Please note that this cerebral perfusion study and report is Quantitative and targets supratentorial (cerebral) perfusion for evaluation of large vessel territory acute ischemia/infarction. For example, lacunar infarcts, and brainstem/posterior fossa    ischemia/infarction are not evaluated on this study.  Data acquisition is subject to artifacts which can yield non-anatomically plausible perfusion maps which may be due to motion, bolus timing, signal to noise ratio, or other  "technical factors.    Perfusion map abnormalities which show non-anatomic distributions are likely artifact.   This study is not \"stand-alone\" and should only be utilized for diagnosis, management/treatment in correlation with CT, CTA, and/or MRI and clinical factors.         CT-HEAD W/O   Final Result         1.  No acute intracranial abnormality.               EC-LATRICIA W/O CONT    (Results Pending)      Total time of the discharge process exceeds 45 minutes.  "

## 2025-02-18 NOTE — ANESTHESIA PREPROCEDURE EVALUATION
Date/Time: 02/18/25 1000    Scheduled providers: Arnulfo MCMAHON M.D.; Nathan Troncoso M.D.    Procedure: EC-LATRICIA W/ CONT    Indications: Stroke (CVA)    Location: [facility] - Echocardiology [facility]            Relevant Problems   NEURO   (positive) Acute CVA (cerebrovascular accident) (HCC)   (positive) Acute ischemic stroke (HCC)   (positive) Migraines      CARDIAC   (positive) Migraines       Physical Exam    Airway   Mallampati: II  TM distance: >3 FB  Neck ROM: full       Cardiovascular - normal exam  Rhythm: regular  Rate: normal  (-) murmur     Dental - normal exam           Pulmonary - normal exam  Breath sounds clear to auscultation     Abdominal    Neurological - normal exam                   Anesthesia Plan    ASA 2       Plan - general       Airway plan will be natural airway          Induction: intravenous      Pertinent diagnostic labs and testing reviewed    Informed Consent:    Anesthetic plan and risks discussed with patient.

## 2025-02-18 NOTE — OR NURSING
1107 Patient arrived to unit from echo.  Report from anesthesia and RN.  Patient is sleeping at this time.    1115 Patient arouses to voice, c/o dizziness, denies pain.  Patient updated on plan of care.  1130 Patient tolerating oral intake.  1140 Patient reports an improvement in dizziness.  1150 Report called to Christine RN.    1205 Patient transferred to [place] [id number] via [facility] with transport.

## 2025-02-18 NOTE — CONSULTS
Cardiology Initial Consult Note    Reason for Consult:  Asked by Dr Jacey Hoffmann D.O. to see this patient with acute CVA  Patient's PCP: Pcp Pt States None    CC:   Chief Complaint   Patient presents with    Possible Stroke    Facial Droop    Weakness       HPI:    This is a 24 year old man with no significant past cardiac history who was admitted to the hospital on 2/14/2025 with left sided weakness.  Did have CT showing distal right M2 occlusion and perfusion defect, he received TNK and went for emergent IR thrombectomy.  Subsequently had transthoracic echocardiogram performed which showed no major cardiac abnormalities.    Cardiology was asked to perform transesophageal echocardiogram for CVA workup to rule out cardioembolic causes of CVA. LATRICIA was performed today.  LATRICIA revealed normal LV function, no presence of left atrial appendage thrombus.  However, he was noted to have presence of PFO with bidirectional shunt.      Medications / Drug list prior to admission:  No current facility-administered medications on file prior to encounter.     No current outpatient medications on file prior to encounter.       Current list of administered Medications:    Current Facility-Administered Medications:     [MAR Hold] apixaban (Eliquis) tablet 5 mg, 5 mg, Oral, BID, Jacey Hoffmann D.O.    ondansetron (Zofran) syringe/vial injection 4 mg, 4 mg, Intravenous, Once PRN, Nathan Troncoso M.D.    haloperidol lactate (Haldol) injection 1 mg, 1 mg, Intravenous, Q15 MIN PRN, Nathan Troncoso M.D.    diphenhydrAMINE (Benadryl) injection 12.5 mg, 12.5 mg, Intravenous, Q15 MIN PRN, Nathan Troncoso M.D.    lactated ringers infusion, , Intravenous, Continuous, Nathan Troncoso M.D.    [MAR Hold] oxyCODONE immediate-release (Roxicodone) tablet 2.5-5 mg, 2.5-5 mg, Oral, Q6HRS PRN, Celena Funez M.D., 5 mg at 02/17/25 0628    [MAR Hold] senna-docusate (Pericolace Or Senokot S) 8.6-50 MG per tablet 2 Tablet, 2 Tablet, Oral, Q EVENING, 2  Tablet at 02/17/25 1738 **AND** [MAR Hold] polyethylene glycol/lytes (Miralax) Packet 1 Packet, 1 Packet, Oral, QDAY PRN, Celena Funez M.D., 1 Packet at 02/17/25 1352    [MAR Hold] Respiratory Therapy Consult, , Nebulization, Continuous RT, Marcos Reyez M.D.    [MAR Hold] acetaminophen (Tylenol) tablet 650 mg, 650 mg, Oral, Q6HRS PRN, Marcos Reyez M.D., 650 mg at 02/16/25 0503    [MAR Hold] ondansetron (Zofran) syringe/vial injection 4 mg, 4 mg, Intravenous, Q4HRS PRN, Marcos Reyez M.D., 4 mg at 02/14/25 0732    [MAR Hold] ondansetron (Zofran ODT) dispertab 4 mg, 4 mg, Oral, Q4HRS PRN, Marcos Reyez M.D.    [MAR Hold] hydrALAZINE (Apresoline) injection 10-20 mg, 10-20 mg, Intravenous, Q4HRS PRN, Marcos Reyez M.D.    [MAR Hold] labetalol (Normodyne/Trandate) injection 10-20 mg, 10-20 mg, Intravenous, Q4HRS PRN, Marcos Reyez M.D.    [MAR Hold] LORazepam (Ativan) injection 0.5 mg, 0.5 mg, Intravenous, Q4HRS PRN, Marcos Reyez M.D., 0.5 mg at 02/14/25 0814    History reviewed. No pertinent past medical history.    History reviewed. No pertinent surgical history.    History reviewed. No pertinent family history.  Patient family history was personally reviewed, no pertinent family history to current presentation    Social History     Tobacco Use    Smoking status: Never    Smokeless tobacco: Never   Vaping Use    Vaping status: Never Used   Substance Use Topics    Alcohol use: No    Drug use: Yes     Types: Marijuana, Inhaled       ALLERGIES:  No Known Allergies    Review of systems:  A complete review of symptoms was reviewed with the patient. This is reviewed in H&P and PMH. ALL OTHERS reviewed and negative    Physical exam:  Patient Vitals for the past 24 hrs:   BP Temp Temp src Pulse Resp SpO2   02/18/25 1137 99/59 -- -- 70 18 98 %   02/18/25 1122 90/59 -- -- 84 16 98 %   02/18/25 1107 111/58 36.2 °C (97.2 °F) Temporal 72 14 99 %   02/18/25 0833  122/81 36.7 °C (98.1 °F) Temporal 71 16 95 %   02/18/25 0757 122/62 36.7 °C (98.1 °F) Temporal 60 16 95 %   02/18/25 0413 133/79 36.7 °C (98.1 °F) Temporal 60 16 96 %   02/17/25 1951 (!) 142/80 36.8 °C (98.2 °F) Temporal 78 18 95 %   02/17/25 1600 128/69 36.5 °C (97.7 °F) Temporal 72 18 95 %     General: Not in acute distress, lying comfortably in bed  EYES: No jaundice  HEENT: OP clear   Neck:  No carotid bruits, No JVD appreciated  CVS:  RRR, Normal S1, S2. No murmurs, rubs or gallops  Resp: Normal respiratory effort, lungs CTA bilaterally. No rales or rhonchi  Abdomen: Soft, non-distended, non-tender to palpation  Neurological: Alert and oriented x3, moves all extremities  Psychiatric: Appropriate affect  Extremities:   Extremities warm, 2+ bilateral radial pulses.  2+ bilateral dp pulses, no lower extremity edema bilaterally      Data:  Laboratory studies personally reviewed by me:  No results found for this or any previous visit (from the past 24 hours).    Imaging:  US-EXTREMITY ARTERY LOWER UNILAT RIGHT   Final Result      MR-BRAIN-W/O   Final Result      1.  14 mm focus of acute cerebral infarction in the right posterior basal ganglia. No hemorrhagic transformation.   2.  5 mm lacunar size focus of acute infarction at the right anterior frontal convexity. No hemorrhagic transformation. Right MCA sylvian branch territory.   3.  Remainder of the study is within normal limits.      CT-HEAD W/O   Final Result      No acute intracranial abnormality.               EC-ECHOCARDIOGRAM COMPLETE W/O CONT   Final Result      IR-THROMBO MECHANICAL ARTERY,INIT   Final Result      1.  Diagnostic angiogram demonstrates occlusion of few right frontal and right parietal M4 branches indicating recanalization of previously seen M3 occlusion likely from the intravenous TNK. The mechanical thrombectomy is not indicated.   2.  TICI 2c      DX-CHEST-PORTABLE (1 VIEW)   Final Result         1.  No acute cardiopulmonary disease.     "  CT-CTA NECK WITH & W/O-POST PROCESSING   Final Result         1.  CT angiogram of the neck within normal limits.         CT-CTA HEAD WITH & W/O-POST PROCESS   Final Result         1.  Segmental area of proximal right M3 narrowing suggesting component of occlusion or vasospasm      These findings were discussed with the patient's clinician, Juan Youssef, on 2/14/2025 5:38 AM.      CT-CEREBRAL PERFUSION ANALYSIS   Final Result         1. Cerebral blood flow less than 30% possibly representing completed infarct = 0 mL. Based on distribution of this finding, this is unlikely to represent artifact.      2. T Max more than 6 seconds possibly representing combination of completed infarct and ischemia = 31 mL. Based on the distribution of this finding, this is unlikely to represent artifact.      3. Mismatched volume possibly representing ischemic brain/penumbra= 31 mL      4.  Please note that this cerebral perfusion study and report is Quantitative and targets supratentorial (cerebral) perfusion for evaluation of large vessel territory acute ischemia/infarction. For example, lacunar infarcts, and brainstem/posterior fossa    ischemia/infarction are not evaluated on this study.  Data acquisition is subject to artifacts which can yield non-anatomically plausible perfusion maps which may be due to motion, bolus timing, signal to noise ratio, or other technical factors.    Perfusion map abnormalities which show non-anatomic distributions are likely artifact.   This study is not \"stand-alone\" and should only be utilized for diagnosis, management/treatment in correlation with CT, CTA, and/or MRI and clinical factors.         CT-HEAD W/O   Final Result         1.  No acute intracranial abnormality.               EC-LATRICIA W/O CONT    (Results Pending)         EKG tracings personally reviewed by me which shows a sinus bradycardia.    TTE:  No prior study is available for comparison.   Normal echocardiogram  LV EF 57%  No " evidence of intra-atrial shunt      All pertinent features of laboratory and imaging reviewed including primary images where applicable      Principal Problem:    Acute ischemic stroke (HCC) (POA: Yes)  Active Problems:    Migraines (POA: Unknown)    Hypokalemia (POA: Unknown)    Lymphocytosis (POA: Unknown)    Acute CVA (cerebrovascular accident) (HCC) (POA: Yes)  Resolved Problems:    * No resolved hospital problems. *      Assessment / Plan:  Acute CVA status post TNKase and thrombectomy  Concern for paradoxical emboli  Presence of patent foramen ovale with bidirectional shunt    Recommendations:  His transesophageal echocardiogram shows findings of patent foramen ovale with bidirectional shunt.  Given his young age and presentation of acute CVA due to paradoxical emboli, he is a candidate for PFO closure.  Arrangements will be made for the patient to follow-up and establish care with the structural heart team for evaluation of PFO closure with Dr. Gasca.  Will also need to rule out atrial fibrillation and will require Zio patch on discharge.  Agree with Eliquis for systemic anticoagulation.  Continue Eliquis 5 mg twice daily as recommended by neurology.  At this time, no additional cardiac recommendations, cardiology will sign off.  An appointment will be made for the patient to see the structural heart team.      I personally discussed his case with  Dr Jacey Hoffmann D.O.    No future appointments.    It is my pleasure to participate in the care of Mr. Hastings.  Please do not hesitate to contact me with questions or concerns.    Arnulfo Mercado MD  Cardiologist, [facility]    2/18/2025    Please note that this dictation was created using voice recognition software. I have made every reasonable attempt to correct obvious errors, but it is possible there are errors of grammar and possibly content that I did not discover before finalizing the note.

## 2025-02-18 NOTE — PROGRESS NOTES
[facility] Discussed discharge paperwork and medications with patient. Answered all questions. No home meds to return, M2B given to patient. Patient ambulated out, personal belongings in hand.

## 2025-02-18 NOTE — ANESTHESIA POSTPROCEDURE EVALUATION
Patient: Daniel Hastings    Procedure Summary       Date: 02/18/25 Room / Location: [facility] - Echocardiology [facility]    Anesthesia Start: 1036 Anesthesia Stop: 1111    Procedure: EC-LATRICIA W/O CONT Diagnosis: (Stroke (CVA))    Scheduled Providers: Arnulfo MCMAHON M.D.; Nathan Troncoso M.D. Responsible Provider: Nathan Troncoso M.D.    Anesthesia Type: general ASA Status: 2            Final Anesthesia Type: general  Last vitals  BP   Blood Pressure: 122/81    Temp   36.7 °C (98.1 °F)    Pulse   71   Resp   16    SpO2   99 %      Anesthesia Post Evaluation    Patient location during evaluation: PACU  Patient participation: complete - patient participated  Level of consciousness: sleepy but conscious  Pain score: 0    Airway patency: patent  Anesthetic complications: no  Cardiovascular status: hemodynamically stable  Respiratory status: acceptable  Hydration status: euvolemic    PONV: none          No notable events documented.     Nurse Pain Score: 0 (NPRS)

## 2025-02-18 NOTE — ANESTHESIA TIME REPORT
Anesthesia Start and Stop Event Times       Date Time Event    2/18/2025 1035 Ready for Procedure     1036 Anesthesia Start     1111 Anesthesia Stop          Responsible Staff  02/18/25      Name Role Begin End    Nathan Troncoso M.D. Anesth 1036 1111          Overtime Reason:  no overtime (within assigned shift)    Comments:

## 2025-02-18 NOTE — CARE PLAN
The patient is Stable - Low risk of patient condition declining or worsening    Shift Goals  Clinical Goals: stable neuro assessments, safety when ambulating, rest  Patient Goals: rest and LATRICIA  Family Goals: MISHA    Patient is progressing towards the following goals:    Problem: Optimal Care of the Stroke Patient  Goal: Optimal acute care for the stroke patient  Description: Target End Date:  1 to 3 days    - Vital signs and neuro checks performed and documented per order  - NIHSS completed and documented per order  - Continuous telemetry monitoring for 72 hours or until discontinued by provider  - Head CT without contrast obtained  - Consideration of MRI/MRA  - MRI screening form completed in worklist if MRI ordered  - Echocardiogram with Bubble Study ordered/completed with consideration of LATRICIA  - Carotid Doppler ordered/completed (Not required if CTA of neck completed in ED)  - Lipid Panel obtained within 48 hours of admission  - PT, PTT, INR obtained per Anticoagulation orders (if applicable)  - Antithrombotic therapy by end of hospital day 2 for ischemic stroke. Provider must document reason if contraindicated.  - Venous Thromboembolism (VTE) Prophylaxis by end of hospital day 2 for ischemic and hemorrhagic stroke. Provider must document reason if contraindicated  - Dysphagia screen completed and documented prior to any PO intake. Patient to remain NPO until Speech Therapy evaluation if thrombolytic or thrombectomy performed  - Rehabilitation assessment including PT/OT/SLP evaluations for referral to Physical Medicine and Rehabilitation services. If none needed, provider needs to document reason  - Neurology consult placed  - Consideration of cardiology consult for cryptogenic strokes  Outcome: Progressing     Problem: Knowledge Deficit - Stroke Education  Goal: Patient's knowledge of stroke and risk factors will improve  Description: Target End Date:  1-3 days or as soon as patient condition allows    Document  in Patient Education    1.  Stroke education booklet provided  2.  Education regarding EMS activation, need for follow up, medication prescribed at discharge, risk factors for stroke/lifestyle modifications, warning signs and symptoms of stroke provided  Outcome: Progressing     Problem: Psychosocial - Patient Condition  Goal: Patient's ability to verbalize feelings about condition will improve  Description: Target End Date:  Prior to discharge or change in level of care    1.  Discuss coping with medical condition and its effects  2.  Encourage patient participation in care  3.  Encourage acknowledgement of body changes and accompanying emotions  4.  Perform depression screening  Outcome: Progressing     Problem: Discharge Planning - Stroke  Goal: Patient’s continuum of care needs will be met  Description: Target End Date:  Prior to discharge or change in level of care    1.  Potential discharge barriers identified upon admission and throughout hospital stay  2.  Ensure appropriate referrals in place for follow up with specialists after discharge  3.  Ensure appropriate referrals in place for DMEs if applicable  4.  Collaboration with transitional care team and interdisciplinary team to meet discharge needs  5.  Involve patient and family/caregiver in prioritizing goals for discharge planning  6.  Educate patient and caregiver about discharge instructions, medications, and follow up appointments  7.  Referral placed to [facility]  8.  Assure orders and follow up appointments are made for outpatient extended cardiac monitoring if appropriate  Outcome: Progressing     Problem: Neuro Status  Goal: Neuro status will remain stable or improve  Description: Target End Date:  Prior to discharge or change in level of care    Document on Neuro assessment in the Assessment flowsheet    1.  Assess and monitor neurologic status per provider order/protocol/unit policy  2.  Assess level of consciousness and  orientation  3.  Assess for speech, dysarthria, dysphagia, facial symmetry  4.  Assess visual field, eye movements, gaze preference, pupil reaction and size  5.  Assess muscle strength and motor response in all four extremities  6.  Assess for sensation (numbness and tingling)  7.  Assess basic neuro reflexes (cough, gag, corneal)  8.  Identify changes in neuro status and report to provider for testing/treatment orders  Outcome: Progressing     Problem: Self Care  Goal: Patient will have the ability to perform ADLs independently or with assistance (bathe, groom, dress, toilet and feed)  Description: Target End Date:  Prior to discharge or change in level of care    Document on ADL flowsheet    1.  Assess the capability and level of deficiency to perform ADLs  2.  Encourage family/care giver involvement  3.  Provide assistive devices  4.  Consider PT/OT evaluations  5.  Maintain support, give positive feedback, encourage self-care allowing extra time and verbal cuing as needed  6.  Avoid doing something for patients they can do themselves, but provide assistance as needed  7.  Assist in anticipating/planning individual needs  8.  Collaborate with Case Management and  to meet discharge needs  Outcome: Progressing

## 2025-02-18 NOTE — DISCHARGE INSTRUCTIONS
Ischemic Stroke  Discharge Instructions    You experienced an Ischemic Stroke.  Ischemic stroke is the most common type of stroke and happens when an artery in the brain becomes blocked by a plaque fragment or blood clot. Typically, these blockages travel from the heart or larger arteries that supply the brain.  The brain needs a constant supply of blood, which carries oxygen and nutrients it needs to function.  A stroke occurs when one of these arteries to the brain is either blocked or bursts. As a result, part of the brain does not get the blood it needs, so it starts to die.     Treatment Received:  Mechanical Thrombectomy    You had a Mechanical Thrombectomy.  A Mechanical Thrombectomy is a procedure to remove blood clots from the brain after an ischemic stroke.  The procedure involves making a small incision in the groin, and threading thin tubes (catheters) through your blood vessels to the clot. A tiny device at the catheter's tip grabs the clot and removes it, restoring blood flow to the brain.    Home Care Instructions:    Catheter insertion site care  If you have a bandage, make sure you:  Wash your hands with soap and water for at least 20 seconds before and after you change your bandage. If you cannot use soap and water, use hand .  Change your bandage as told by your doctor.  Check the site every day for signs of infection. Check for:  More redness, swelling, or pain.  Fluid or blood.  Warmth.  Pus or a bad smell.  Activity  Do not lift anything that is heavier than 5 lb (2.3 kg)  Rest  For the first 2-3 days after your procedure, or as long as told:  Try not to climb stairs.  Do not squat.  Return to your normal activities when your provider says that it is safe.  Get up to take short walks every 1 to 2 hours. Ask for help if you feel weak or unsteady.  Do exercises as told by your doctor.    General instructions  Take over-the-counter and prescription medicines as directed by your  "provider.  Do not smoke or use any products that contain nicotine or tobacco.   Keep all follow-up visits.    Contact your provider if:  You have more redness, swelling, or pain around the site where the catheter was put in.  You have a fever.    Thrombolytic Treatment for Ischemic Stroke    You received thrombolytic treatment for an Ischemic Stroke. Thrombolytics are medicines that can break up blood clots. These medicines help to treat a stroke when given as soon as possible after stroke symptoms start.  The medicine was given to you through an IV tube.  In some cases, the medicine may go to the affected area through a thin tube (catheter). This tube is usually put in at the top of your leg.    Get help right away if:  You have blood in your vomit, pee, or poop.  You have a serious fall or accident, or you hit your head.  You have symptoms of an allergy to the medicine, such as a rash or trouble breathing.  You have other signs of a stroke, such as:  A sudden, very bad headache with no known cause.  Feeling like you may vomit (nausea).  Vomiting.  A seizure    Stroke Risk Factors    You are at increased risk of having another stroke event.  See your Patient Stroke Guide to help reduce your stroke risk. These are your specific risk factors:       Get help right away if you have any signs of a stroke.  \"BE FAST\" is an easy way to remember the main warning signs of a stroke:  B - Balance. Dizziness, sudden trouble walking, or loss of balance.  E - Eyes. Trouble seeing or a change in how you see.  F - Face. Sudden weakness or loss of feeling in the face. The face or eyelid may droop on one side.  A - Arms. Weakness or loss of feeling in an arm. This happens all of a sudden and most often on one side of the body.  S - Speech. Sudden trouble speaking, slurred speech, or trouble understanding what people say.  T - Time. Time to call emergency services. Write down what time symptoms started.                "

## 2025-02-18 NOTE — PROGRESS NOTES
Neurology Progress Note  Neurohospitalist Service, [facility] Neuro[facility]iences    Referring Physician: Jacey Hoffmann D.O.      Interval History: No acute events overnight.  Pending LATRICIA today. APL panel negative.    Review of systems: In addition to what is detailed in the HPI and/or updated in the interval history, all other systems reviewed and are negative.    Past Medical History, Past Surgical History and Social History reviewed and unchanged from prior    Medications:    Current Facility-Administered Medications:     apixaban (Eliquis) tablet 5 mg, 5 mg, Oral, BID, Jacey Hoffmann D.O.    oxyCODONE immediate-release (Roxicodone) tablet 2.5-5 mg, 2.5-5 mg, Oral, Q6HRS PRN, Celena Funez M.D., 5 mg at 02/17/25 0628    senna-docusate (Pericolace Or Senokot S) 8.6-50 MG per tablet 2 Tablet, 2 Tablet, Oral, Q EVENING, 2 Tablet at 02/17/25 1738 **AND** polyethylene glycol/lytes (Miralax) Packet 1 Packet, 1 Packet, Oral, QDAY PRN, Celena Funez M.D., 1 Packet at 02/17/25 1352    enoxaparin (Lovenox) inj 40 mg, 40 mg, Subcutaneous, DAILY AT 1800, Eliezer Higgins D.O., 40 mg at 02/17/25 1739    Respiratory Therapy Consult, , Nebulization, Continuous RT, Marcos Reyez M.D.    acetaminophen (Tylenol) tablet 650 mg, 650 mg, Oral, Q6HRS PRN, Marcos Reyez M.D., 650 mg at 02/16/25 0503    ondansetron (Zofran) syringe/vial injection 4 mg, 4 mg, Intravenous, Q4HRS PRN, Marcos Reyez M.D., 4 mg at 02/14/25 0732    ondansetron (Zofran ODT) dispertab 4 mg, 4 mg, Oral, Q4HRS PRN, Marcos Reyez M.D.    hydrALAZINE (Apresoline) injection 10-20 mg, 10-20 mg, Intravenous, Q4HRS PRN, Marcos Reyez M.D.    labetalol (Normodyne/Trandate) injection 10-20 mg, 10-20 mg, Intravenous, Q4HRS PRN, Marcos Reyez M.D.    LORazepam (Ativan) injection 0.5 mg, 0.5 mg, Intravenous, Q4HRS PRN, Marcos Reyez M.D., 0.5 mg at 02/14/25 0814    Physical Examination:   /81    "Pulse 71   Temp 36.7 °C (98.1 °F) (Temporal)   Resp 16   Ht 1.803 m (5' 11\")   Wt 59.1 kg (130 lb 6 oz)   SpO2 95%   BMI 18.18 kg/m²       General: Patient is awake and in no acute distress  Neck: There is normal range of motion  CV: Regular rate   Extremities:  Warm, dry, and intact, without peripheral lower extremity edema    NEUROLOGICAL EXAM:     Mental status: Awake, alert and fully oriented, follows commands  Speech and language: Speech is clear and fluent. The patient is able to name and repeat.  Cranial nerve exam: Visual fields are full. There is no nystagmus. Extraocular muscles are intact.  Slight L facial droop  Motor exam: There is sustained antigravity with no downward drift in bilateral arms and legs.    Sensory exam:  Reacts to tactile in all 4 extremities, there is no neglect to double stim  Coordination: No ataxia on bilateral FTN testing    NIHSS 1 for subtle L face droop    Objective Data:    Labs:  Lab Results   Component Value Date/Time    PROTHROMBTM 14.4 02/14/2025 05:16 AM    INR 1.11 02/14/2025 05:16 AM      Lab Results   Component Value Date/Time    WBC 9.3 02/17/2025 05:56 AM    RBC 5.29 02/17/2025 05:56 AM    HEMOGLOBIN 16.3 02/17/2025 05:56 AM    HEMATOCRIT 48.1 02/17/2025 05:56 AM    MCV 90.9 02/17/2025 05:56 AM    MCH 30.8 02/17/2025 05:56 AM    MCHC 33.9 02/17/2025 05:56 AM    MPV 10.1 02/17/2025 05:56 AM    NEUTSPOLYS 68.70 02/15/2025 03:00 AM    LYMPHOCYTES 21.30 (L) 02/15/2025 03:00 AM    MONOCYTES 9.20 02/15/2025 03:00 AM    EOSINOPHILS 0.20 02/15/2025 03:00 AM    BASOPHILS 0.30 02/15/2025 03:00 AM      Lab Results   Component Value Date/Time    SODIUM 136 02/17/2025 05:56 AM    POTASSIUM 4.6 02/17/2025 05:56 AM    CHLORIDE 101 02/17/2025 05:56 AM    CO2 23 02/17/2025 05:56 AM    GLUCOSE 90 02/17/2025 05:56 AM    BUN 14 02/17/2025 05:56 AM    CREATININE 1.03 02/17/2025 05:56 AM      Lab Results   Component Value Date/Time    CHOLSTRLTOT 125 02/14/2025 05:16 AM    LDL 63 " 02/14/2025 05:16 AM    HDL 46 02/14/2025 05:16 AM    TRIGLYCERIDE 81 02/14/2025 05:16 AM       Lab Results   Component Value Date/Time    ALKPHOSPHAT 72 02/14/2025 05:16 AM    ASTSGOT 26 02/14/2025 05:16 AM    ALTSGPT 16 02/14/2025 05:16 AM    TBILIRUBIN 0.5 02/14/2025 05:16 AM        Imaging/Testing:    I interpreted and/or reviewed the patient's neuroimaging    US-EXTREMITY ARTERY LOWER UNILAT RIGHT   Final Result      MR-BRAIN-W/O   Final Result      1.  14 mm focus of acute cerebral infarction in the right posterior basal ganglia. No hemorrhagic transformation.   2.  5 mm lacunar size focus of acute infarction at the right anterior frontal convexity. No hemorrhagic transformation. Right MCA sylvian branch territory.   3.  Remainder of the study is within normal limits.      CT-HEAD W/O   Final Result      No acute intracranial abnormality.               EC-ECHOCARDIOGRAM COMPLETE W/O CONT   Final Result      IR-THROMBO MECHANICAL ARTERY,INIT   Final Result      1.  Diagnostic angiogram demonstrates occlusion of few right frontal and right parietal M4 branches indicating recanalization of previously seen M3 occlusion likely from the intravenous TNK. The mechanical thrombectomy is not indicated.   2.  TICI 2c      DX-CHEST-PORTABLE (1 VIEW)   Final Result         1.  No acute cardiopulmonary disease.      CT-CTA NECK WITH & W/O-POST PROCESSING   Final Result         1.  CT angiogram of the neck within normal limits.         CT-CTA HEAD WITH & W/O-POST PROCESS   Final Result         1.  Segmental area of proximal right M3 narrowing suggesting component of occlusion or vasospasm      These findings were discussed with the patient's clinician, Juan Youssef, on 2/14/2025 5:38 AM.      CT-CEREBRAL PERFUSION ANALYSIS   Final Result         1. Cerebral blood flow less than 30% possibly representing completed infarct = 0 mL. Based on distribution of this finding, this is unlikely to represent artifact.      2. T Max  "more than 6 seconds possibly representing combination of completed infarct and ischemia = 31 mL. Based on the distribution of this finding, this is unlikely to represent artifact.      3. Mismatched volume possibly representing ischemic brain/penumbra= 31 mL      4.  Please note that this cerebral perfusion study and report is Quantitative and targets supratentorial (cerebral) perfusion for evaluation of large vessel territory acute ischemia/infarction. For example, lacunar infarcts, and brainstem/posterior fossa    ischemia/infarction are not evaluated on this study.  Data acquisition is subject to artifacts which can yield non-anatomically plausible perfusion maps which may be due to motion, bolus timing, signal to noise ratio, or other technical factors.    Perfusion map abnormalities which show non-anatomic distributions are likely artifact.   This study is not \"stand-alone\" and should only be utilized for diagnosis, management/treatment in correlation with CT, CTA, and/or MRI and clinical factors.         CT-HEAD W/O   Final Result         1.  No acute intracranial abnormality.               EC-LATRICIA W/ CONT    (Results Pending)       Assessment and Plan:  Daniel Hastings is a 24 year old man with no known vascular risk factors or cardiac disease, presenting with L side weakness, found to have a R M2 thrombus.  He is now s/p TNK and mechanical thrombectomy.  Clinical exam improved, and symptoms have nearly resolved.  Stroke etiology is cryptogenic.  CTA head and neck was a normal study, he does not have known genetic predisposition to clot, and antiphospholipid panel was negative.  TTE without evidence of structural heart disease and there is no PFO.  He is pending a LATRICIA and recommend ziopatch at discharge.  In absence of traditional vascular risk factors, I have no suspicion for atheroembolic disease- as such, will recommend empiric anticoagulation with apixaban therapy.  Follow in stroke clinic to discuss " long-term DOAC therapy.    Problem list:   Cryptogenic stroke    Plan:   - q4h neurochecks/NIHSS while admitted   - pending LATRICIA today   - ziopatch monitoring at discharge   - long-term BP goal is 100-130/60-80, currently at goal untreated   - LDL is 63 untreated- may defer statin therapy as already below goal < 70, and mechanism of stroke is unlikely related to atherosclerotic disease   - no PT/OT/SLP needs as near neurologic baseline   - stop ASA therapy, start apixaban 5mg BID.  Anticipate 1 year of therapy.  May discuss cessation of therapy after this time in stroke clinic   - [facility] Neurovascular clinic follow up   - please reconsult Stroke Neurology with any additional questions or concerns    The evaluation of the patient, and recommended management, was discussed with Dr. Jacey Hoffmann. I have performed a physical exam and reviewed and updated ROS and Plan today (2/18/2025).     Chandan Pierce MD  Vascular Neurology

## 2025-02-19 LAB
APTT SCREEN TO CONFIRM RATIO: 0.81 {RATIO}
CONFIRM DRVVT STA-STACLOT: NORMAL S
DRVVT SCREEN TO CONFIRM RATIO: 0.87 {RATIO}
DRVVT SCREEN TO CONFIRM RATIO: NORMAL {RATIO}
DRVVT/DRVVT CFM P DOAC NEUT NORM PPP-RTO: NORMAL {RATIO}
F5 P.R506Q BLD/T QL: NEGATIVE
HEPARIN NT PPP QL: NORMAL
LA 3 SCREEN W REFLEX-IMP: NORMAL
LMW HEPARIN IND PLT AB SER QL: NORMAL
MIXING DRVVT: NORMAL S
PROTHROMBIN TIME: 14.5 S (ref 12–15.5)
SCREEN APTT: NORMAL S
TESTING SUMMARY NL11779: NORMAL
THROMBIN TIME: NORMAL S

## 2025-02-20 ENCOUNTER — TELEPHONE (OUTPATIENT)
Dept: CARDIOLOGY | Facility: MEDICAL CENTER | Age: 25
End: 2025-02-20
Payer: COMMERCIAL

## 2025-02-20 LAB — LV EJECT FRACT  99904: 65

## 2025-02-20 PROCEDURE — 93312 ECHO TRANSESOPHAGEAL: CPT | Mod: 26 | Performed by: INTERNAL MEDICINE

## 2025-02-20 PROCEDURE — 93325 DOPPLER ECHO COLOR FLOW MAPG: CPT | Mod: 26 | Performed by: INTERNAL MEDICINE

## 2025-02-20 NOTE — Clinical Note
REFERRAL APPROVAL NOTICE         Sent on February 20, 2025                   Daniel Hastings  [address] [place] [address]                   Dear Mr. Hastings,    After a careful review of the medical information and benefit coverage, [facility] has processed your referral. See below for additional details.    If applicable, you must be actively enrolled with your insurance for coverage of the authorized service. If you have any questions regarding your coverage, please contact your insurance directly.    REFERRAL INFORMATION   Referral #:  [id number]  Referred-To Department    Referred-By Provider:  Neurology    Marcos Reyez M.D.   Neurology [facility]      [address]  [address]  [place] [address]  [phone] [address], [address]  [place] [id number] [phone]    Referral Start Date:  02/14/2025  Referral End Date:   02/14/2026           SCHEDULING  If you do not already have an appointment, please call [phone] to make an appointment.   MORE INFORMATION  As a reminder, [facility] ownership has changed, meaning this location is now owned and operated by [facility]. As such, we want to clarify that our patients should expect to receive two separate bills for the services received at [facility] - one representing the [facility] facility fees as the owner of the establishment, and the other to represent the physician's services and subsequent fees. You can speak with your insurance carrier for a pricing estimate by calling the customer service number on the back of your card and ask about charges for a hospital outpatient visit.  If you do not already have a [employer] account, sign up at: [email or web address]  You can access your medical information, make appointments, see lab results, billing information, and more.  If you have questions regarding this referral, please contact  the [facility] Referrals department at:             [phone]. Monday -  Friday 7:30AM - 5:00PM.      Sincerely,  [facility]

## 2025-02-20 NOTE — TELEPHONE ENCOUNTER
Referral from: Roberto COWART for PFO consultation.    Patient called on 2/20/25 and scheduled with Dr. Gasca on 4/9/25 after heart monitor is completed.    Discussed with patient consultation appointment.    Patient given dates and times of consultations.    All questions answered.    Phone number given to patient for [facility] for any further questions or concerns.

## 2025-02-21 ENCOUNTER — TELEPHONE (OUTPATIENT)
Dept: HEALTH INFORMATION MANAGEMENT | Facility: OTHER | Age: 25
End: 2025-02-21
Payer: COMMERCIAL

## 2025-02-21 LAB — F2 C.20210G>A GENO BLD/T: NEGATIVE

## 2025-02-21 SDOH — ECONOMIC STABILITY: INCOME INSECURITY: HOW HARD IS IT FOR YOU TO PAY FOR THE VERY BASICS LIKE FOOD, HOUSING, MEDICAL CARE, AND HEATING?: SOMEWHAT HARD

## 2025-02-21 SDOH — ECONOMIC STABILITY: INCOME INSECURITY: IN THE LAST 12 MONTHS, WAS THERE A TIME WHEN YOU WERE NOT ABLE TO PAY THE MORTGAGE OR RENT ON TIME?: NO

## 2025-02-21 SDOH — ECONOMIC STABILITY: FOOD INSECURITY: WITHIN THE PAST 12 MONTHS, YOU WORRIED THAT YOUR FOOD WOULD RUN OUT BEFORE YOU GOT MONEY TO BUY MORE.: NEVER TRUE

## 2025-02-21 SDOH — ECONOMIC STABILITY: FOOD INSECURITY: WITHIN THE PAST 12 MONTHS, THE FOOD YOU BOUGHT JUST DIDN'T LAST AND YOU DIDN'T HAVE MONEY TO GET MORE.: NEVER TRUE

## 2025-02-21 SDOH — ECONOMIC STABILITY: HOUSING INSECURITY
IN THE LAST 12 MONTHS, WAS THERE A TIME WHEN YOU DID NOT HAVE A STEADY PLACE TO SLEEP OR SLEPT IN A SHELTER (INCLUDING NOW)?: NO

## 2025-02-21 SDOH — HEALTH STABILITY: PHYSICAL HEALTH: ON AVERAGE, HOW MANY MINUTES DO YOU ENGAGE IN EXERCISE AT THIS LEVEL?: 60 MIN

## 2025-02-21 SDOH — HEALTH STABILITY: PHYSICAL HEALTH: ON AVERAGE, HOW MANY DAYS PER WEEK DO YOU ENGAGE IN MODERATE TO STRENUOUS EXERCISE (LIKE A BRISK WALK)?: 6 DAYS

## 2025-02-21 SDOH — HEALTH STABILITY: MENTAL HEALTH
STRESS IS WHEN SOMEONE FEELS TENSE, NERVOUS, ANXIOUS, OR CAN'T SLEEP AT NIGHT BECAUSE THEIR MIND IS TROUBLED. HOW STRESSED ARE YOU?: ONLY A LITTLE

## 2025-02-21 SDOH — ECONOMIC STABILITY: TRANSPORTATION INSECURITY
IN THE PAST 12 MONTHS, HAS LACK OF TRANSPORTATION KEPT YOU FROM MEETINGS, WORK, OR FROM GETTING THINGS NEEDED FOR DAILY LIVING?: NO

## 2025-02-21 ASSESSMENT — SOCIAL DETERMINANTS OF HEALTH (SDOH)
HOW OFTEN DO YOU HAVE SIX OR MORE DRINKS ON ONE OCCASION: NEVER
IN A TYPICAL WEEK, HOW MANY TIMES DO YOU TALK ON THE PHONE WITH FAMILY, FRIENDS, OR NEIGHBORS?: MORE THAN THREE TIMES A WEEK
WITHIN THE PAST 12 MONTHS, YOU WORRIED THAT YOUR FOOD WOULD RUN OUT BEFORE YOU GOT THE MONEY TO BUY MORE: NEVER TRUE
HOW OFTEN DO YOU ATTEND CHURCH OR RELIGIOUS SERVICES?: NEVER
ARE YOU MARRIED, WIDOWED, DIVORCED, SEPARATED, NEVER MARRIED, OR LIVING WITH A PARTNER?: LIVING WITH PARTNER
HOW MANY DRINKS CONTAINING ALCOHOL DO YOU HAVE ON A TYPICAL DAY WHEN YOU ARE DRINKING: 1 OR 2
IN THE PAST 12 MONTHS, HAS THE ELECTRIC, GAS, OIL, OR WATER COMPANY THREATENED TO SHUT OFF SERVICE IN YOUR HOME?: NO
HOW OFTEN DO YOU ATTEND CHURCH OR RELIGIOUS SERVICES?: NEVER
ARE YOU MARRIED, WIDOWED, DIVORCED, SEPARATED, NEVER MARRIED, OR LIVING WITH A PARTNER?: LIVING WITH PARTNER
HOW OFTEN DO YOU ATTENT MEETINGS OF THE CLUB OR ORGANIZATION YOU BELONG TO?: NEVER
HOW OFTEN DO YOU HAVE A DRINK CONTAINING ALCOHOL: MONTHLY OR LESS
HOW OFTEN DO YOU GET TOGETHER WITH FRIENDS OR RELATIVES?: TWICE A WEEK
HOW HARD IS IT FOR YOU TO PAY FOR THE VERY BASICS LIKE FOOD, HOUSING, MEDICAL CARE, AND HEATING?: SOMEWHAT HARD
HOW OFTEN DO YOU GET TOGETHER WITH FRIENDS OR RELATIVES?: TWICE A WEEK
HOW OFTEN DO YOU ATTENT MEETINGS OF THE CLUB OR ORGANIZATION YOU BELONG TO?: NEVER
DO YOU BELONG TO ANY CLUBS OR ORGANIZATIONS SUCH AS CHURCH GROUPS UNIONS, FRATERNAL OR ATHLETIC GROUPS, OR SCHOOL GROUPS?: NO
IN A TYPICAL WEEK, HOW MANY TIMES DO YOU TALK ON THE PHONE WITH FAMILY, FRIENDS, OR NEIGHBORS?: MORE THAN THREE TIMES A WEEK
DO YOU BELONG TO ANY CLUBS OR ORGANIZATIONS SUCH AS CHURCH GROUPS UNIONS, FRATERNAL OR ATHLETIC GROUPS, OR SCHOOL GROUPS?: NO

## 2025-02-21 ASSESSMENT — LIFESTYLE VARIABLES
HOW OFTEN DO YOU HAVE A DRINK CONTAINING ALCOHOL: MONTHLY OR LESS
SKIP TO QUESTIONS 9-10: 1
HOW MANY STANDARD DRINKS CONTAINING ALCOHOL DO YOU HAVE ON A TYPICAL DAY: 1 OR 2
HOW OFTEN DO YOU HAVE SIX OR MORE DRINKS ON ONE OCCASION: NEVER
AUDIT-C TOTAL SCORE: 1

## 2025-02-27 ENCOUNTER — OFFICE VISIT (OUTPATIENT)
Dept: MEDICAL GROUP | Facility: PHYSICIAN GROUP | Age: 25
End: 2025-02-27
Payer: COMMERCIAL

## 2025-02-27 VITALS
HEART RATE: 78 BPM | HEIGHT: 71 IN | WEIGHT: 143 LBS | DIASTOLIC BLOOD PRESSURE: 62 MMHG | SYSTOLIC BLOOD PRESSURE: 110 MMHG | RESPIRATION RATE: 12 BRPM | BODY MASS INDEX: 20.02 KG/M2 | OXYGEN SATURATION: 96 % | TEMPERATURE: 98.8 F

## 2025-02-27 DIAGNOSIS — G43.909 MIGRAINE WITHOUT STATUS MIGRAINOSUS, NOT INTRACTABLE, UNSPECIFIED MIGRAINE TYPE: ICD-10-CM

## 2025-02-27 DIAGNOSIS — Q21.12 PATENT FORAMEN OVALE: ICD-10-CM

## 2025-02-27 DIAGNOSIS — I63.9 ACUTE CVA (CEREBROVASCULAR ACCIDENT) (HCC): ICD-10-CM

## 2025-02-27 ASSESSMENT — FIBROSIS 4 INDEX: FIB4 SCORE: 0.64

## 2025-02-27 NOTE — PROGRESS NOTES
CC:    Chief Complaint   Patient presents with    [facility] Care     Paperwork     Medication Refill     apixaban       HISTORY OF THE PRESENT ILLNESS: Patient is a 24 y.o. male presenting to [facility] primary care     Pt had recent CVA from PFO. States he is feeling fine today and not having any residual focal neurological deficits. Has appt next month with structural cardiologist to have PFO repaired.   Pt has hx of migraines.   Needing forms filled out for SDI    No problem-specific Assessment & Plan notes found for this encounter.    Allergies: Patient has no known allergies.    Current Outpatient Medications Ordered in Epic   Medication Sig Dispense Refill    apixaban (ELIQUIS) 5mg Tab Take 1 Tablet by mouth 2 times a day for 90 days. Indications: Thromboembolism secondary to Atrial Fibrillation 180 Tablet 0     No current Epic-ordered facility-administered medications on file.       No past medical history on file.    No past surgical history on file.    Social History     Tobacco Use    Smoking status: Never    Smokeless tobacco: Never   Vaping Use    Vaping status: Never Used   Substance Use Topics    Alcohol use: No    Drug use: Not Currently     Types: Marijuana, Inhaled       Social History     Social History Narrative    Not on file       No family history on file.    ROS:     - Constitutional: Negative for fever, chills, unexpected weight change, and fatigue/generalized weakness.     - HEENT: Negative for headaches, vision changes, hearing changes, ear pain, ear discharge, rhinorrhea, sinus congestion, sore throat, and neck pain.      - Respiratory: Negative for cough, sputum production, chest congestion, dyspnea, wheezing, and crackles.      - Cardiovascular: Negative for chest pain, palpitations, orthopnea, and bilateral lower extremity edema.     - Gastrointestinal: Positive for constipation. Negative for heartburn, nausea, vomiting, abdominal pain, hematochezia, melena, diarrhea,  and  "greasy/foul-smelling stools.     - Genitourinary: Negative for dysuria, polyuria, hematuria, pyuria, urinary urgency, and urinary incontinence.     - Musculoskeletal: Negative for myalgias, back pain, and joint pain.     - Skin: Negative for rash, itching, cyanotic skin color change.     - Neurological: Positive for migraines. Negative for dizziness, tingling, tremors, focal sensory deficit, focal weakness     - Endo/Heme/Allergies: Does not bruise/bleed easily.     - Psychiatric/Behavioral: Negative for depression, suicidal/homicidal ideation and memory loss.            .      Exam: /62   Pulse 78   Temp 37.1 °C (98.8 °F) (Temporal)   Resp 12   Ht 1.803 m (5' 11\")   Wt 64.9 kg (143 lb)   SpO2 96%  Body mass index is 19.94 kg/m².    General: Normal appearing. No acute distress.  Skin: Warm and dry.  No obvious lesions.  HEENT: Normocephalic. Eyes conjunctiva clear lids without ptosis, ears normal shape and contour  Neurologic: Grossly nonfocal, A&O x3, gait normal,  Musculoskeletal: No deformity or swelling.   Extremities: No extremity cyanosis, clubbing, or edema.  Psych: Normal mood and affect. Judgment and insight is normal.    Please note that this dictation was created using voice recognition software. I have made every reasonable attempt to correct obvious errors, but I expect that there are errors of grammar and possibly content that I did not discover before finalizing the note.      Assessment/Plan    1. Acute CVA (cerebrovascular accident) (HCC)  For filled out. Continue eliquis.     2. Patent foramen ovale  F/u with cardiology    3. Migraine without status migrainosus, not intractable, unspecified migraine type  Address at later visit    "

## 2025-03-03 ENCOUNTER — TELEPHONE (OUTPATIENT)
Dept: NEUROLOGY | Facility: MEDICAL CENTER | Age: 25
End: 2025-03-03
Payer: COMMERCIAL

## 2025-03-13 ENCOUNTER — TELEPHONE (OUTPATIENT)
Dept: CARDIOLOGY | Facility: MEDICAL CENTER | Age: 25
End: 2025-03-13
Payer: COMMERCIAL

## 2025-03-13 DIAGNOSIS — I63.9 ACUTE ISCHEMIC STROKE (HCC): ICD-10-CM

## 2025-03-13 NOTE — TELEPHONE ENCOUNTER
ZioAT EOS to VR for review on 3/14/25 as ADD    Preliminary findings:    Sinus Rhythm  with an avg rate of 82 bpm    Rare isolated Supraventricular and Ventricular ectopics    Ventricular Trigeminy present    4 symptomatic patient events:  SR       To Patient- These findings are preliminary and HAVE NOT yet been reviewed by your provider. Please allow our team time to review these findings and someone from our office will contact you if any follow up is necessary.

## 2025-03-26 ENCOUNTER — PATIENT MESSAGE (OUTPATIENT)
Dept: MEDICAL GROUP | Facility: PHYSICIAN GROUP | Age: 25
End: 2025-03-26
Payer: COMMERCIAL

## 2025-03-26 DIAGNOSIS — I63.9 ACUTE ISCHEMIC STROKE (HCC): ICD-10-CM

## 2025-03-26 NOTE — PATIENT COMMUNICATION
Received request via: Patient    Was the patient seen in the last year in this department? Yes    Does the patient have an active prescription (recently filled or refills available) for medication(s) requested? No    [facility]    Does the patient have intermediate Plus and need 100-day supply? (This applies to ALL medications) Patient does not have SCP

## 2025-04-09 ENCOUNTER — TELEPHONE (OUTPATIENT)
Dept: CARDIOLOGY | Facility: MEDICAL CENTER | Age: 25
End: 2025-04-09

## 2025-04-09 ENCOUNTER — OFFICE VISIT (OUTPATIENT)
Dept: CARDIOLOGY | Facility: MEDICAL CENTER | Age: 25
End: 2025-04-09
Attending: INTERNAL MEDICINE
Payer: COMMERCIAL

## 2025-04-09 VITALS
OXYGEN SATURATION: 95 % | HEIGHT: 71 IN | SYSTOLIC BLOOD PRESSURE: 100 MMHG | BODY MASS INDEX: 19.32 KG/M2 | WEIGHT: 138 LBS | HEART RATE: 68 BPM | RESPIRATION RATE: 16 BRPM | DIASTOLIC BLOOD PRESSURE: 68 MMHG

## 2025-04-09 DIAGNOSIS — I63.9 CRYPTOGENIC STROKE (HCC): ICD-10-CM

## 2025-04-09 DIAGNOSIS — Q21.12 PFO (PATENT FORAMEN OVALE): ICD-10-CM

## 2025-04-09 PROCEDURE — 99211 OFF/OP EST MAY X REQ PHY/QHP: CPT | Performed by: INTERNAL MEDICINE

## 2025-04-09 PROCEDURE — 3078F DIAST BP <80 MM HG: CPT | Performed by: INTERNAL MEDICINE

## 2025-04-09 PROCEDURE — 3074F SYST BP LT 130 MM HG: CPT | Performed by: INTERNAL MEDICINE

## 2025-04-09 PROCEDURE — 99204 OFFICE O/P NEW MOD 45 MIN: CPT | Performed by: INTERNAL MEDICINE

## 2025-04-09 ASSESSMENT — FIBROSIS 4 INDEX: FIB4 SCORE: 0.64

## 2025-04-09 NOTE — PROGRESS NOTES
"    Interventional cardiology Initial Consultation Note      Chief Complaint: Patent foramen ovale, cryptogenic stroke    Daniel Hastings is a 24 y.o. male  patient presented today consultation regarding patent foramen ovale, cryptogenic stroke.  He noticed sudden facial droop, presented to the hospital, found to have distal right M2 occlusion, underwent thrombectomy, successfully recovered completely.  His evaluation showed patent foramen ovale with bidirectional shunt        No past medical history on file.          Current Outpatient Medications   Medication Sig Dispense Refill    apixaban (ELIQUIS) 5mg Tab Take 1 Tablet by mouth 2 times a day for 90 days. Indications: Thromboembolism secondary to Atrial Fibrillation 180 Tablet 0     No current facility-administered medications for this visit.             Physical Exam:  Ambulatory Vitals  /68 (BP Location: Left arm, Patient Position: Sitting, BP Cuff Size: Adult)   Pulse 68   Resp 16   Ht 1.803 m (5' 11\")   Wt 62.6 kg (138 lb)   SpO2 95%    Oxygen Therapy:  Pulse Oximetry: 95 %  BP Readings from Last 4 Encounters:   04/09/25 100/68   02/27/25 110/62   02/18/25 107/65   01/06/22 110/70       Weight/BMI: Body mass index is 19.25 kg/m².  Wt Readings from Last 4 Encounters:   04/09/25 62.6 kg (138 lb)   02/27/25 64.9 kg (143 lb)   02/15/25 59.1 kg (130 lb 6 oz)   01/06/22 65.6 kg (144 lb 10 oz)           General: Well appearing and in no apparent distress  Neck: carotid bruits absent  Lungs: respiratory sounds  normal  Heart: Regular rhythm,  No palpable thrills on palpation, murmurs absent, no rubs,   Lower extremity edema absent.       Transesophageal echocardiogram reviewed, independently interpreted showed mobile interatrial septum, PFO, bidirectional shunt.    MRI brain 2/15/2025    1.  14 mm focus of acute cerebral infarction in the right posterior basal ganglia. No hemorrhagic transformation.  2.  5 mm lacunar size focus of acute infarction at the " right anterior frontal convexity. No hemorrhagic transformation. Right MCA sylvian branch territory.  3.  Remainder of the study is within normal limits.    Hypercoagulable workup was negative for factor V Leiden, lupus anticoagulant, normal protein C, protein S function, normal Antithrombin, factor II function    Cardiac event monitor for 14 days showed no A-fib.    Medical Decision Making:  Problem List Items Addressed This Visit    None  Visit Diagnoses         PFO (patent foramen ovale)        Relevant Orders    CL-PATENT FORAMEN OVALE CLOSURE      Cryptogenic stroke (HCC)        Relevant Orders    CL-PATENT FORAMEN OVALE CLOSURE          Patient with cryptogenic/embolic stroke with high risk PFO, no other identifiable risk factors for the stroke.  He will benefit from PFO closure to reduce recurrent stroke.  Risk benefits of the procedure discussed in detail with the patient.  He would like to proceed.        This note was dictated using Dragon speech recognition software.    Irvin STANTON  Interventional cardiologist  [facility], [address]  [address] [address]  Phone: [phone]  Fax: [phone]

## 2025-04-09 NOTE — TELEPHONE ENCOUNTER
----- Message from Physician Irvin Gasca M.D. sent at 4/9/2025 10:39 AM PDT -----  Please schedule PFO closure with Arnulfo Marx.  Sedation regular Cath Lab schedule

## 2025-04-10 NOTE — TELEPHONE ENCOUNTER
Patient is scheduled on 4-22-25 for a PFO closure with . Patient was told to hold Eliquis AM day of procedure and to check in at 5:30 for a 7:30 procedure. H&P was done on 4-9-25 by . Pre admit to call patient. Arnulfo Shepherd and Mary Lou U/S Rep notified by email on 4-10-25 to be at procedure.

## 2025-04-11 ENCOUNTER — APPOINTMENT (OUTPATIENT)
Dept: ADMISSIONS | Facility: MEDICAL CENTER | Age: 25
End: 2025-04-11
Attending: INTERNAL MEDICINE
Payer: COMMERCIAL

## 2025-04-17 ENCOUNTER — OFFICE VISIT (OUTPATIENT)
Dept: NEUROLOGY | Facility: MEDICAL CENTER | Age: 25
End: 2025-04-17
Attending: PSYCHIATRY & NEUROLOGY
Payer: COMMERCIAL

## 2025-04-17 VITALS
HEIGHT: 71 IN | BODY MASS INDEX: 19.97 KG/M2 | SYSTOLIC BLOOD PRESSURE: 108 MMHG | HEART RATE: 66 BPM | RESPIRATION RATE: 10 BRPM | WEIGHT: 142.64 LBS | OXYGEN SATURATION: 95 % | DIASTOLIC BLOOD PRESSURE: 58 MMHG

## 2025-04-17 DIAGNOSIS — Q21.12 PATENT FORAMEN OVALE: ICD-10-CM

## 2025-04-17 DIAGNOSIS — I63.9 ACUTE ISCHEMIC STROKE (HCC): ICD-10-CM

## 2025-04-17 PROCEDURE — 99214 OFFICE O/P EST MOD 30 MIN: CPT | Performed by: PSYCHIATRY & NEUROLOGY

## 2025-04-17 PROCEDURE — 3078F DIAST BP <80 MM HG: CPT | Performed by: PSYCHIATRY & NEUROLOGY

## 2025-04-17 PROCEDURE — 99211 OFF/OP EST MAY X REQ PHY/QHP: CPT | Performed by: PSYCHIATRY & NEUROLOGY

## 2025-04-17 PROCEDURE — 3074F SYST BP LT 130 MM HG: CPT | Performed by: PSYCHIATRY & NEUROLOGY

## 2025-04-17 ASSESSMENT — FIBROSIS 4 INDEX: FIB4 SCORE: 0.64

## 2025-04-17 NOTE — TELEPHONE ENCOUNTER
Irvin Gasca M.D.  You5 minutes ago (4:42 PM)     He can go back to work in 3-4 days      You  Irvin Gasca M.D.7 minutes ago (4:39 PM)     To AK, please advise   ----------------------------------------------------------------------------------------    [employer] message sent.

## 2025-04-17 NOTE — PROGRESS NOTES
Vascular Neurology Clinic Note  [facility]    Referring Physician: Jj Moody P.A.-C.    HPI: Daniel Hastings is a pleasant 24 y.o. right-handed male with no significant past medical history presenting to stroke bridge clinic for follow up.  He was initially seen at [facility] on 2/14/2025 due to slurred speech and left-sided weakness.  He was found to have right distal M2 thrombus for which he received off-label TNK followed by successful thrombectomy.  His comprehensive stroke workup was unremarkable including hypercoagulable workup and TTE with bubble study which did not reveal PFO, however subsequently his LATRICIA revealed evidence of PFO and he is scheduled for PFO closure.  Cardiac monitoring with Zio patch was negative for atrial fibrillation.  He was started on empiric anticoagulation with Eliquis and he is compliant with.  His stroke symptom has nearly resolved.    Review of systems: In addition to what is detailed in the HPI above, all other systems reviewed and are negative.    Past Medical History:    has no past medical history on file.    FHx:  family history is not on file.    SHx:   reports that he has never smoked. He has never used smokeless tobacco. He reports current drug use. Drugs: Marijuana and Inhaled. He reports that he does not drink alcohol.    Allergies:  No Known Allergies    Medications:    Current Outpatient Medications:     apixaban (ELIQUIS) 5mg Tab, Take 1 Tablet by mouth 2 times a day for 90 days. Indications: Thromboembolism secondary to Atrial Fibrillation, Disp: 180 Tablet, Rfl: 0    Physical Examination:     NEUROLOGICAL EXAM:     Mental status: Awake, alert and fully oriented  Speech and language: Speech is clear and fluent. The patient is able to name and repeat, and follow commands  Cranial nerve exam:  Visual fields are full, extraocular movements are intact, face is symmetric  Motor exam: There is sustained antigravity with no downward drift in  bilateral arms and legs.    Sensory exam:  Intact to light touch in all 4 distal extremities, there is no neglect to double stim  Coordination: No ataxia on bilateral finger-to-nose testing  Gait: Deferred due to patient preference      NIHSS: National Institutes of Health Stroke Scale    1a. Level of Consciousness (Alert, drowsy, etc): 0= Alert    1b. LOC Questions (Month, age): 0= Answers both correctly    1c. LOC Commands (Open/close eyes make fist/let go): 0= Obeys both correctly    2.   Best Gaze (Eyes open - patient follows examiner's finger on face): 0= Normal    3.   Visual Fields (introduce visual stimulus/threat to patient's field quadrants): 0= No visual loss  4.   Facial Paresis (Show teeth, raise eyebrows and squeeze eyes shut): 0= Normal     5a. Motor Arm - Left (Elevate arm to 90 degrees if patient is sitting, 45 degrees if  supine): 0= No drift    5b. Motor Arm - Right (Elevate arm to 90 degrees if patient is sitting, 45 degrees if supine): 0= No drift    6a. Motor Leg - Left (Elevate leg 30 degrees with patient supine): 0= No drift    6b. Motor Leg - Right  (Elevate leg 30 degrees with patient supine): 0= No drift    7.   Limb Ataxia (Finger-nose, heel down shin): 0= No ataxia    8.   Sensory (Pin prick to face, arm, trunk and leg - compare side to side): 0= Normal    9.  Best Language (Name item, describe a picture and read sentences): 0= No aphasia    10. Dysarthria (Evaluate speech clarity by patient repeating listed words): 0= Normal articulation    11. Extinction and Inattention (Use information from prior testing to identify neglect or  double simultaneous stimuli testing): 0= No neglect    Total NIH Score: 0      Baseline Modified Marin Scale (MRS): 0 = No symptoms    Objective Data:    Labs  Lab Results   Component Value Date/Time    WBC 9.3 02/17/2025 05:56 AM    RBC 5.29 02/17/2025 05:56 AM    HEMOGLOBIN 16.3 02/17/2025 05:56 AM    HEMATOCRIT 48.1 02/17/2025 05:56 AM    PLATELETCT 254  02/17/2025 05:56 AM      Lab Results   Component Value Date/Time    PROTHROMBTM 14.4 02/14/2025 05:16 AM    INR 1.11 02/14/2025 05:16 AM     Lab Results   Component Value Date/Time    SODIUM 136 02/17/2025 05:56 AM    POTASSIUM 4.6 02/17/2025 05:56 AM    CHLORIDE 101 02/17/2025 05:56 AM    CO2 23 02/17/2025 05:56 AM    GLUCOSE 90 02/17/2025 05:56 AM    BUN 14 02/17/2025 05:56 AM    CREATININE 1.03 02/17/2025 05:56 AM      Lab Results   Component Value Date/Time    CHOLSTRLTOT 125 02/14/2025 05:16 AM    LDL 63 02/14/2025 05:16 AM    HDL 46 02/14/2025 05:16 AM    TRIGLYCERIDE 81 02/14/2025 05:16 AM       Lab Results   Component Value Date/Time    HBA1C 6.1 (H) 02/14/2025 05:16 AM           Imaging/Testing:  I interpreted and/or reviewed the patient's neuroimaging with the patient in the room and showed him his stroke on brain MRI.    CT angiogram of the head and neck were unremarkable except right M2 thrombus which was removed by IR.    No orders to display         Assessment:  Daniel Hastings is a 24 y.o. right handed male with no significant past medical history who presented with left sided weakness involving face arm and leg and was found to have right distal M2 thrombus status post administration of TNK followed by successful thrombectomy with near resolution of his symptoms.  Hypercoagulable workup was negative.  LATRICIA revealed evidence of PFO for which he is scheduled for closure.  Cardiac monitoring with Zio patch was unremarkable.        TOAST classification:  [] Large artery atherosclerosis  [x] Cardioembolic  [] Small vessel disease (lacunar)  [] Other:   [] Undetermined    Problem list:  -Right MCA stroke.  - PFO.  - Migraine with auras      Plan:  -He will continue with Eliquis 5 mg twice a day  - Follow-up with cardiology for scheduled PFO closure  - Discussed lifestyle modification for stroke prevention optimization including daily exercise and healthy diet and compliance with medication.  - Follow-up  with primary care physician.      Please note that this dictation was created using voice recognition software. I have made every reasonable attempt to correct obvious errors, but I expect that there are errors of grammar and possibly content that I did not discover before finalizing the note.       Josie Henao MD  Vascular Neurology

## 2025-04-17 NOTE — TELEPHONE ENCOUNTER
Patient is scheduled next Tuesday for his PFO closure and wants to know what the post recovery is going to be.

## 2025-04-21 ENCOUNTER — PRE-ADMISSION TESTING (OUTPATIENT)
Dept: ADMISSIONS | Facility: MEDICAL CENTER | Age: 25
End: 2025-04-21
Attending: INTERNAL MEDICINE
Payer: COMMERCIAL

## 2025-04-22 ENCOUNTER — HOSPITAL ENCOUNTER (OUTPATIENT)
Facility: MEDICAL CENTER | Age: 25
End: 2025-04-22
Attending: INTERNAL MEDICINE | Admitting: INTERNAL MEDICINE
Payer: COMMERCIAL

## 2025-04-22 ENCOUNTER — APPOINTMENT (OUTPATIENT)
Dept: CARDIOLOGY | Facility: MEDICAL CENTER | Age: 25
End: 2025-04-22
Attending: INTERNAL MEDICINE
Payer: COMMERCIAL

## 2025-04-22 VITALS
WEIGHT: 140.87 LBS | SYSTOLIC BLOOD PRESSURE: 132 MMHG | BODY MASS INDEX: 19.72 KG/M2 | OXYGEN SATURATION: 97 % | HEART RATE: 90 BPM | DIASTOLIC BLOOD PRESSURE: 86 MMHG | HEIGHT: 71 IN | TEMPERATURE: 99 F | RESPIRATION RATE: 18 BRPM

## 2025-04-22 DIAGNOSIS — Q21.12 PFO (PATENT FORAMEN OVALE): ICD-10-CM

## 2025-04-22 DIAGNOSIS — Q21.12 PATENT FORAMEN OVALE: ICD-10-CM

## 2025-04-22 DIAGNOSIS — I63.9 CRYPTOGENIC STROKE (HCC): ICD-10-CM

## 2025-04-22 LAB
ACT BLD: 250 SEC (ref 74–137)
ALBUMIN SERPL BCP-MCNC: 4.6 G/DL (ref 3.2–4.9)
ALBUMIN/GLOB SERPL: 1.6 G/DL
ALP SERPL-CCNC: 64 U/L (ref 30–99)
ALT SERPL-CCNC: 23 U/L (ref 2–50)
ANION GAP SERPL CALC-SCNC: 13 MMOL/L (ref 7–16)
AST SERPL-CCNC: 29 U/L (ref 12–45)
BILIRUB SERPL-MCNC: 0.8 MG/DL (ref 0.1–1.5)
BUN SERPL-MCNC: 12 MG/DL (ref 8–22)
CALCIUM ALBUM COR SERPL-MCNC: 9.1 MG/DL (ref 8.5–10.5)
CALCIUM SERPL-MCNC: 9.6 MG/DL (ref 8.5–10.5)
CHLORIDE SERPL-SCNC: 105 MMOL/L (ref 96–112)
CO2 SERPL-SCNC: 21 MMOL/L (ref 20–33)
CREAT SERPL-MCNC: 0.85 MG/DL (ref 0.5–1.4)
EKG IMPRESSION: NORMAL
ERYTHROCYTE [DISTWIDTH] IN BLOOD BY AUTOMATED COUNT: 40.5 FL (ref 35.9–50)
GFR SERPLBLD CREATININE-BSD FMLA CKD-EPI: 123 ML/MIN/1.73 M 2
GLOBULIN SER CALC-MCNC: 2.8 G/DL (ref 1.9–3.5)
GLUCOSE SERPL-MCNC: 98 MG/DL (ref 65–99)
HCT VFR BLD AUTO: 46.9 % (ref 42–52)
HGB BLD-MCNC: 16.4 G/DL (ref 14–18)
INR PPP: 1.11 (ref 0.87–1.13)
MCH RBC QN AUTO: 31.5 PG (ref 27–33)
MCHC RBC AUTO-ENTMCNC: 35 G/DL (ref 32.3–36.5)
MCV RBC AUTO: 90 FL (ref 81.4–97.8)
PLATELET # BLD AUTO: 205 K/UL (ref 164–446)
PMV BLD AUTO: 10.1 FL (ref 9–12.9)
POTASSIUM SERPL-SCNC: 4 MMOL/L (ref 3.6–5.5)
PROT SERPL-MCNC: 7.4 G/DL (ref 6–8.2)
PROTHROMBIN TIME: 14.3 SEC (ref 12–14.6)
RBC # BLD AUTO: 5.21 M/UL (ref 4.7–6.1)
SODIUM SERPL-SCNC: 139 MMOL/L (ref 135–145)
WBC # BLD AUTO: 4.2 K/UL (ref 4.8–10.8)

## 2025-04-22 PROCEDURE — 700111 HCHG RX REV CODE 636 W/ 250 OVERRIDE (IP): Mod: JZ | Performed by: NURSE PRACTITIONER

## 2025-04-22 PROCEDURE — 700111 HCHG RX REV CODE 636 W/ 250 OVERRIDE (IP)

## 2025-04-22 PROCEDURE — A9270 NON-COVERED ITEM OR SERVICE: HCPCS

## 2025-04-22 PROCEDURE — 80053 COMPREHEN METABOLIC PANEL: CPT

## 2025-04-22 PROCEDURE — 93005 ELECTROCARDIOGRAM TRACING: CPT | Mod: TC | Performed by: INTERNAL MEDICINE

## 2025-04-22 PROCEDURE — 93580 TRANSCATH CLOSURE OF ASD: CPT | Performed by: INTERNAL MEDICINE

## 2025-04-22 PROCEDURE — 160036 HCHG PACU - EA ADDL 30 MINS PHASE I

## 2025-04-22 PROCEDURE — 160047 HCHG PACU  - EA ADDL 30 MINS PHASE II

## 2025-04-22 PROCEDURE — 85610 PROTHROMBIN TIME: CPT

## 2025-04-22 PROCEDURE — 93662 INTRACARDIAC ECG (ICE): CPT | Mod: 26 | Performed by: INTERNAL MEDICINE

## 2025-04-22 PROCEDURE — 700102 HCHG RX REV CODE 250 W/ 637 OVERRIDE(OP)

## 2025-04-22 PROCEDURE — 160015 HCHG STAT PREOP MINUTES

## 2025-04-22 PROCEDURE — 85027 COMPLETE CBC AUTOMATED: CPT

## 2025-04-22 PROCEDURE — 99152 MOD SED SAME PHYS/QHP 5/>YRS: CPT | Performed by: INTERNAL MEDICINE

## 2025-04-22 PROCEDURE — 160035 HCHG PACU - 1ST 60 MINS PHASE I

## 2025-04-22 PROCEDURE — 85347 COAGULATION TIME ACTIVATED: CPT

## 2025-04-22 PROCEDURE — 99153 MOD SED SAME PHYS/QHP EA: CPT

## 2025-04-22 PROCEDURE — 160002 HCHG RECOVERY MINUTES (STAT)

## 2025-04-22 PROCEDURE — 93010 ELECTROCARDIOGRAM REPORT: CPT | Performed by: STUDENT IN AN ORGANIZED HEALTH CARE EDUCATION/TRAINING PROGRAM

## 2025-04-22 PROCEDURE — 700101 HCHG RX REV CODE 250

## 2025-04-22 PROCEDURE — 160046 HCHG PACU - 1ST 60 MINS PHASE II

## 2025-04-22 RX ORDER — BUPIVACAINE HYDROCHLORIDE 2.5 MG/ML
INJECTION, SOLUTION EPIDURAL; INFILTRATION; INTRACAUDAL; PERINEURAL
Status: COMPLETED
Start: 2025-04-22 | End: 2025-04-22

## 2025-04-22 RX ORDER — MORPHINE SULFATE 4 MG/ML
2 INJECTION INTRAVENOUS ONCE
Status: COMPLETED | OUTPATIENT
Start: 2025-04-22 | End: 2025-04-22

## 2025-04-22 RX ORDER — METOPROLOL SUCCINATE 25 MG/1
25 TABLET, EXTENDED RELEASE ORAL DAILY
Qty: 30 TABLET | Refills: 0 | Status: SHIPPED | OUTPATIENT
Start: 2025-04-22

## 2025-04-22 RX ORDER — MIDAZOLAM HYDROCHLORIDE 1 MG/ML
INJECTION INTRAMUSCULAR; INTRAVENOUS
Status: COMPLETED
Start: 2025-04-22 | End: 2025-04-22

## 2025-04-22 RX ORDER — LIDOCAINE HYDROCHLORIDE AND EPINEPHRINE BITARTRATE 20; .01 MG/ML; MG/ML
INJECTION, SOLUTION SUBCUTANEOUS
Status: DISCONTINUED
Start: 2025-04-22 | End: 2025-04-22 | Stop reason: HOSPADM

## 2025-04-22 RX ORDER — HEPARIN SODIUM 1000 [USP'U]/ML
INJECTION, SOLUTION INTRAVENOUS; SUBCUTANEOUS
Status: COMPLETED
Start: 2025-04-22 | End: 2025-04-22

## 2025-04-22 RX ORDER — ASPIRIN 81 MG/1
81 TABLET ORAL DAILY
Qty: 100 TABLET | Refills: 3 | Status: SHIPPED | OUTPATIENT
Start: 2025-04-22

## 2025-04-22 RX ORDER — LIDOCAINE HYDROCHLORIDE 20 MG/ML
INJECTION, SOLUTION INFILTRATION; PERINEURAL
Status: COMPLETED
Start: 2025-04-22 | End: 2025-04-22

## 2025-04-22 RX ORDER — CLOPIDOGREL BISULFATE 75 MG/1
75 TABLET ORAL DAILY
Qty: 90 TABLET | Refills: 1 | Status: SHIPPED | OUTPATIENT
Start: 2025-04-22

## 2025-04-22 RX ORDER — DIPHENHYDRAMINE HYDROCHLORIDE 50 MG/ML
INJECTION, SOLUTION INTRAMUSCULAR; INTRAVENOUS
Status: COMPLETED
Start: 2025-04-22 | End: 2025-04-22

## 2025-04-22 RX ORDER — CEFAZOLIN SODIUM 1 G/3ML
INJECTION, POWDER, FOR SOLUTION INTRAMUSCULAR; INTRAVENOUS
Status: COMPLETED
Start: 2025-04-22 | End: 2025-04-22

## 2025-04-22 RX ORDER — CLOPIDOGREL 300 MG/1
TABLET, FILM COATED ORAL
Status: COMPLETED
Start: 2025-04-22 | End: 2025-04-22

## 2025-04-22 RX ORDER — ASPIRIN 81 MG/1
TABLET, CHEWABLE ORAL
Status: COMPLETED
Start: 2025-04-22 | End: 2025-04-22

## 2025-04-22 RX ADMIN — HEPARIN SODIUM: 1000 INJECTION, SOLUTION INTRAVENOUS; SUBCUTANEOUS at 08:07

## 2025-04-22 RX ADMIN — BUPIVACAINE HYDROCHLORIDE: 2.5 INJECTION, SOLUTION EPIDURAL; INFILTRATION; INTRACAUDAL at 08:07

## 2025-04-22 RX ADMIN — MORPHINE SULFATE 2 MG: 4 INJECTION, SOLUTION INTRAMUSCULAR; INTRAVENOUS at 11:56

## 2025-04-22 RX ADMIN — FENTANYL CITRATE 100 MCG: 50 INJECTION, SOLUTION INTRAMUSCULAR; INTRAVENOUS at 08:16

## 2025-04-22 RX ADMIN — CEFAZOLIN 2000 MG: 1 INJECTION, POWDER, FOR SOLUTION INTRAMUSCULAR; INTRAVENOUS at 08:06

## 2025-04-22 RX ADMIN — ASPIRIN 324 MG: 81 TABLET, CHEWABLE ORAL at 07:54

## 2025-04-22 RX ADMIN — LIDOCAINE HYDROCHLORIDE: 20 INJECTION, SOLUTION INFILTRATION; PERINEURAL at 08:07

## 2025-04-22 RX ADMIN — FENTANYL CITRATE 50 MCG: 50 INJECTION, SOLUTION INTRAMUSCULAR; INTRAVENOUS at 08:53

## 2025-04-22 RX ADMIN — MIDAZOLAM HYDROCHLORIDE 2 MG: 1 INJECTION, SOLUTION INTRAMUSCULAR; INTRAVENOUS at 08:16

## 2025-04-22 RX ADMIN — CLOPIDOGREL BISULFATE 600 MG: 300 TABLET, FILM COATED ORAL at 07:56

## 2025-04-22 RX ADMIN — MIDAZOLAM HYDROCHLORIDE 1 MG: 1 INJECTION, SOLUTION INTRAMUSCULAR; INTRAVENOUS at 08:53

## 2025-04-22 RX ADMIN — MORPHINE SULFATE 2 MG: 4 INJECTION, SOLUTION INTRAMUSCULAR; INTRAVENOUS at 11:26

## 2025-04-22 ASSESSMENT — PAIN DESCRIPTION - PAIN TYPE
TYPE: ACUTE PAIN

## 2025-04-22 ASSESSMENT — FIBROSIS 4 INDEX: FIB4 SCORE: 0.64

## 2025-04-22 NOTE — PROCEDURES
Patent foramen ovale closure    Indication: Cryptogenic stroke with patent foramen ovale    Procedures performed:  1.  Right common femoral vein access  2.  Intracardiac echocardiogram  3.  Successful closure of PFO using 30 mm [place] cardioform device     Procedure in detail:  Patient was explained risks and benefits of PFO closure.  Patient was brought to cardiac catheterization laboratory.  Both groins were prepped and patient was draped in sterile fashion.  Right femoral vein access was obtained with 8 fr sheath for ice probe insertion.  Second access was obtained with 11 fr sheath.  Patient was given heparin and therapeutic ACT was confirmed. Multiplanar imaging was performed using intracardiac echocardiogram and confirmed patent foramen ovale.  Then we used 4 Czech multipurpose catheter and crossed PFO with 0.35 J-wire and placed in left upper pulmonary vein.  This wire was exchanged with stiff Amplatz wire and multipurpose catheter was removed. Aortic and SVC rims were measured after crossing with stiff wire to make device size selection.  A 30 mm [place] cardioform device was prepped in usual fashion.  Delivery system was advanced over an Amplatz stiff wire, then wire was removed.  Device was deployed in usual fashion, left disc on the left side, right disc on the right side.  Imaging was performed and confirmed adequate capture of rims and no residual flow.  Then a tug test was performed to confirm stability of the device.  Device was stable during tug test.  Then device was released in usual fashion.  After releasing imaging was performed and confirmed good positioning.  All catheters and sheaths were removed.  Hemostasis was obtained using manual pressure.    Complications: None  Specimens: None  Estimated blood loss <20 cc.    I supervised moderate sedation over a trained and independent nursing staff.    Sedation start time:08:10 , End time: 08:50    Irvin Gasca  Interventional  cardiologist.  [facility].

## 2025-04-22 NOTE — OR NURSING
0925 Patient arrived to unit from cath lab.  Report from RN.  Patient is awake and alert, denies pain.  Dressing to right groin with some blood on underside of gauze, area is soft. Patient updated on plan of care.  0935 More blood noted to dressing, manual pressure held to site.  0950 Redet APRN updated on patient status, femstop placed.  1000 Sonia called and updated on patient status.  Belongings returned to patient.  1015 Redet at bedside to assess patient.  1040 Patient remains oozy after femstop repositioned.  Redet updated.  1110 Dermabond placed to right groin, hemostasis achieved at lower puncture site.  Manual pressure held to upper puncture site.  1155 Epinephrine-lidocaine given by APRN's, patient medicated per MAR for pain.  Bleeding slower, dermabond placed.  1225 Manual pressure held by RN.  1240 Additional 5 minutes of pressure held, no bleeding noted to site.  1305 Dr. NOWAK at bedside, accessed patients groin site.  Sandbag placed per MD order.  1335 Sandbag removed, right groin is clean, dry and soft.  1400 Patient head of bed elevated.  Zoraida and Redet at bedside, patient to sit up in bed until 1500.  1515 Patient ambulated, tolerated well.  Back to [facility], right groin is clean, dry and soft.  Redet updated on plan of care.  Ok to discharge after 30 more minutes observation.  1550 Patient ambulated, back to [facility], right groin is clean, dry and soft.  Tender to touch.  1610 Right groin is clean, dry and soft.  1622 Patient taken down via wheelchair to meet Sonia.  Reviewed discharge paperwork with pt and Sonia. Discussed diet, activity, medications, follow up care and worsening symptoms. No questions at this time.  Pt discharged home with Sonia.

## 2025-04-22 NOTE — DISCHARGE INSTRUCTIONS
You had PFO closure today.  Do not lift more than 10 pounds for 3-4 days.  If you notice bleeding in groin, rest and apply pressure, it should stop.  Some bruising is normal in the groin area.  Start taking aspirin 81 mg daily- this you will take indefinitely  Start taking clopidogrel 75 mg daily, this you will take for 90 days.  Start metoprolol 25 mg daily, this will help with any palpitations post-procedure, this you can stop in one month.  Stop taking eliquis.  You will need to take antibiotics prior to dental cleaning for next 6 months, dentist usually prescribe this if they don't contact our office, we can send prescription.   Some people may feel heart racing or skipping a beat after this procedure for few weeks, this usually gets better with time.  If there is bleeding at the catheter insertion site, apply pressure for 10 minutes.  If bleeding persists, call 911, and continue to hold pressure until advanced medical support arrives.    What to Expect Post Sedation    Rest and take it easy for the first 24 hours.  A responsible adult is recommended to remain with you during that time.  It is normal to feel sleepy.  We encourage you to not do anything that requires balance, judgment or coordination.    FOR 24 HOURS DO NOT:  Drive, operate machinery or run household appliances.  Drink beer or alcoholic beverages.  Make important decisions or sign legal documents.    To avoid nausea, slowly advance diet as tolerated, avoiding spicy or greasy foods for the first day.  Add more substantial food to your diet according to your provider's instructions.  INCREASE FLUIDS AND FIBER TO AVOID CONSTIPATION.    MILD FLU-LIKE SYMPTOMS ARE NORMAL.  YOU MAY EXPERIENCE GENERALIZED MUSCLE ACHES, THROAT IRRITATION, HEADACHE AND/OR SOME NAUSEA.  If any questions arise, call your provider.  If your provider is not available, please feel free to call the Surgical Center at [phone].    MEDICATIONS: Resume taking daily  medication.  Take prescribed pain medication with food.  If no medication is prescribed, you may take non-aspirin pain medication if needed.  PAIN MEDICATION CAN BE VERY CONSTIPATING.  Take a stool softener or laxative such as senokot, pericolace, or milk of magnesia if needed.    Diet    Resume your normal diet as tolerated.  A diet low in cholesterol, fat, and sodium is recommended for good health.     DRESSING: Keep dressing and incision dry and intact for 24 hours, may remove dressing and shower after 4 PM on 4/23, do not need to replace.  Do not submerge site in water for 7 days.     BOWEL FUNCTION:  If you are having problems, use what you normally would or call your provider for suggestions. It also helps to stay regular by including fiber in your diet (for example: bran or fruits and vegetables) and drink plenty of liquids (water, juice, etc.).

## 2025-04-22 NOTE — LETTER
April 22, 2025    To Whom It May Concern:         This is confirmation that Daniel Hastings attended his scheduled procedure with Dr. Gasca on 4/22/25. Please excuse from work for 2 weeks, can return back to work without any restriction on 5/7/2025.          If you have any questions please do not hesitate to call me at the phone number listed below.    Sincerely,          GURDEEP Gómez  [address] [place] [address], [address] [place] [id number] [phone]

## 2025-05-13 ENCOUNTER — TELEPHONE (OUTPATIENT)
Dept: NEUROLOGY | Facility: MEDICAL CENTER | Age: 25
End: 2025-05-13
Payer: COMMERCIAL

## 2025-05-14 DIAGNOSIS — Q21.12 PATENT FORAMEN OVALE: ICD-10-CM

## 2025-05-14 NOTE — TELEPHONE ENCOUNTER
Is the patient due for a refill? Yes    Was the patient seen the last 15 months? Yes    Date of last office visit: 4/9/2025    Does the patient have an upcoming appointment?  Yes   If yes, When? 5/16/2025    Provider to refill:AK    Does the patient have shelter Plus and need 100-day supply? (This applies to ALL medications) Patient does not have SCP

## 2025-05-15 RX ORDER — METOPROLOL SUCCINATE 25 MG/1
25 TABLET, EXTENDED RELEASE ORAL DAILY
Qty: 90 TABLET | Refills: 3 | OUTPATIENT
Start: 2025-05-15

## 2025-05-15 NOTE — TELEPHONE ENCOUNTER
Refill not appropriate at this time.  Patient to take med for 30 days per post-op instructions on 4/22/25.    Last OV 4/9/25. Current annual labs. Next OV 5/16/25. Reminders sent via [employer].

## 2025-05-16 PROBLEM — Z87.74 S/P PATENT FORAMEN OVALE CLOSURE: Status: ACTIVE | Noted: 2025-05-16

## 2025-05-16 PROBLEM — Z86.73 HISTORY OF ISCHEMIC STROKE: Status: ACTIVE | Noted: 2025-02-14

## 2025-05-16 PROBLEM — I63.9 ACUTE CVA (CEREBROVASCULAR ACCIDENT) (HCC): Status: RESOLVED | Noted: 2025-02-15 | Resolved: 2025-05-16

## 2025-05-19 ENCOUNTER — TELEPHONE (OUTPATIENT)
Dept: CARDIOLOGY | Facility: MEDICAL CENTER | Age: 25
End: 2025-05-19
Payer: COMMERCIAL

## 2025-05-19 NOTE — TELEPHONE ENCOUNTER
Called patient to R/S missed appt with SARAH Warner on 05/16, and patient did not want to reschedule at this time.

## 2025-08-27 ENCOUNTER — OFFICE VISIT (OUTPATIENT)
Dept: MEDICAL GROUP | Facility: PHYSICIAN GROUP | Age: 25
End: 2025-08-27
Payer: COMMERCIAL

## 2025-08-27 VITALS
RESPIRATION RATE: 16 BRPM | SYSTOLIC BLOOD PRESSURE: 116 MMHG | HEIGHT: 71 IN | HEART RATE: 77 BPM | WEIGHT: 137 LBS | OXYGEN SATURATION: 98 % | BODY MASS INDEX: 19.18 KG/M2 | DIASTOLIC BLOOD PRESSURE: 74 MMHG | TEMPERATURE: 96.9 F

## 2025-08-27 DIAGNOSIS — Z86.73 HISTORY OF ISCHEMIC STROKE: ICD-10-CM

## 2025-08-27 DIAGNOSIS — Z87.74 S/P PATENT FORAMEN OVALE CLOSURE: ICD-10-CM

## 2025-08-27 DIAGNOSIS — Z11.59 ENCOUNTER FOR HEPATITIS C SCREENING TEST FOR LOW RISK PATIENT: ICD-10-CM

## 2025-08-27 DIAGNOSIS — Z11.3 SCREEN FOR STD (SEXUALLY TRANSMITTED DISEASE): ICD-10-CM

## 2025-08-27 DIAGNOSIS — Z00.00 PHYSICAL EXAM, ANNUAL: Primary | ICD-10-CM

## 2025-08-27 PROCEDURE — 99395 PREV VISIT EST AGE 18-39: CPT | Performed by: PHYSICIAN ASSISTANT

## 2025-08-27 PROCEDURE — 3074F SYST BP LT 130 MM HG: CPT | Performed by: PHYSICIAN ASSISTANT

## 2025-08-27 PROCEDURE — 3078F DIAST BP <80 MM HG: CPT | Performed by: PHYSICIAN ASSISTANT

## 2025-08-27 RX ORDER — METOPROLOL SUCCINATE 25 MG/1
25 TABLET, EXTENDED RELEASE ORAL DAILY
Qty: 90 TABLET | Refills: 3 | Status: SHIPPED | OUTPATIENT
Start: 2025-08-27

## 2025-08-27 ASSESSMENT — FIBROSIS 4 INDEX: FIB4 SCORE: 0.74
